# Patient Record
Sex: MALE | Race: OTHER
[De-identification: names, ages, dates, MRNs, and addresses within clinical notes are randomized per-mention and may not be internally consistent; named-entity substitution may affect disease eponyms.]

---

## 2018-01-25 ENCOUNTER — HOSPITAL ENCOUNTER (EMERGENCY)
Dept: HOSPITAL 15 - ER | Age: 33
Discharge: HOME | End: 2018-01-25
Payer: COMMERCIAL

## 2018-01-25 VITALS — DIASTOLIC BLOOD PRESSURE: 107 MMHG | SYSTOLIC BLOOD PRESSURE: 152 MMHG

## 2018-01-25 VITALS — HEIGHT: 72 IN | BODY MASS INDEX: 31.83 KG/M2 | WEIGHT: 235 LBS

## 2018-01-25 DIAGNOSIS — F17.210: ICD-10-CM

## 2018-01-25 DIAGNOSIS — I10: Primary | ICD-10-CM

## 2018-01-25 DIAGNOSIS — I48.91: ICD-10-CM

## 2020-03-02 ENCOUNTER — HOSPITAL ENCOUNTER (EMERGENCY)
Dept: HOSPITAL 15 - ER | Age: 35
Discharge: HOME | End: 2020-03-02
Payer: SELF-PAY

## 2020-03-02 VITALS — SYSTOLIC BLOOD PRESSURE: 161 MMHG | DIASTOLIC BLOOD PRESSURE: 93 MMHG

## 2020-03-02 VITALS — HEIGHT: 73 IN | BODY MASS INDEX: 30.48 KG/M2 | WEIGHT: 230 LBS

## 2020-03-02 DIAGNOSIS — J06.9: Primary | ICD-10-CM

## 2020-03-02 DIAGNOSIS — F17.210: ICD-10-CM

## 2020-03-02 DIAGNOSIS — F12.10: ICD-10-CM

## 2020-03-02 PROCEDURE — 71046 X-RAY EXAM CHEST 2 VIEWS: CPT

## 2020-07-24 ENCOUNTER — HOSPITAL ENCOUNTER (INPATIENT)
Dept: HOSPITAL 15 - ER | Age: 35
LOS: 4 days | Discharge: HOME | DRG: 720 | End: 2020-07-28
Attending: INTERNAL MEDICINE | Admitting: INTERNAL MEDICINE
Payer: COMMERCIAL

## 2020-07-24 VITALS — SYSTOLIC BLOOD PRESSURE: 128 MMHG | DIASTOLIC BLOOD PRESSURE: 69 MMHG

## 2020-07-24 VITALS — WEIGHT: 239.64 LBS | BODY MASS INDEX: 31.76 KG/M2 | HEIGHT: 73 IN

## 2020-07-24 DIAGNOSIS — I12.9: ICD-10-CM

## 2020-07-24 DIAGNOSIS — J18.9: ICD-10-CM

## 2020-07-24 DIAGNOSIS — D61.818: ICD-10-CM

## 2020-07-24 DIAGNOSIS — I48.91: ICD-10-CM

## 2020-07-24 DIAGNOSIS — K70.31: ICD-10-CM

## 2020-07-24 DIAGNOSIS — F17.210: ICD-10-CM

## 2020-07-24 DIAGNOSIS — N17.0: ICD-10-CM

## 2020-07-24 DIAGNOSIS — E87.1: ICD-10-CM

## 2020-07-24 DIAGNOSIS — E44.0: ICD-10-CM

## 2020-07-24 DIAGNOSIS — F10.10: ICD-10-CM

## 2020-07-24 DIAGNOSIS — D68.9: ICD-10-CM

## 2020-07-24 DIAGNOSIS — E87.70: ICD-10-CM
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
## 2020-07-24 DIAGNOSIS — E87.6: ICD-10-CM

## 2020-07-24 DIAGNOSIS — A41.9: Primary | ICD-10-CM

## 2020-07-24 DIAGNOSIS — D63.8: ICD-10-CM

## 2020-07-24 DIAGNOSIS — N18.9: ICD-10-CM

## 2020-07-24 DIAGNOSIS — K72.90: ICD-10-CM

## 2020-07-24 LAB
ALBUMIN SERPL-MCNC: 1.6 G/DL (ref 3.4–5)
ALP SERPL-CCNC: 220 U/L (ref 45–117)
ALT SERPL-CCNC: 41 U/L (ref 16–61)
ANION GAP SERPL CALCULATED.3IONS-SCNC: 11 MMOL/L (ref 5–15)
APTT PPP: 47.4 SEC (ref 23.64–32.05)
BILIRUB SERPL-MCNC: 24.6 MG/DL (ref 0.2–1)
BUN SERPL-MCNC: 12 MG/DL (ref 7–18)
BUN/CREAT SERPL: 10.7
CALCIUM SERPL-MCNC: 7.1 MG/DL (ref 8.5–10.1)
CHLORIDE SERPL-SCNC: 84 MMOL/L (ref 98–107)
CO2 SERPL-SCNC: 26 MMOL/L (ref 21–32)
GLUCOSE SERPL-MCNC: 114 MG/DL (ref 74–106)
HCT VFR BLD AUTO: 33.6 % (ref 41–53)
HGB BLD-MCNC: 11.8 G/DL (ref 13.5–17.5)
INR PPP: 1.6 (ref 0.9–1.15)
LACTATE PLASV-SCNC: 3.1 MMOL/L (ref 0.4–2)
MCH RBC QN AUTO: 35.1 PG (ref 28–32)
MCV RBC AUTO: 100.1 FL (ref 80–100)
NRBC BLD QL AUTO: 0 %
POTASSIUM SERPL-SCNC: 2.9 MMOL/L (ref 3.5–5.1)
PROT SERPL-MCNC: 6.7 G/DL (ref 6.4–8.2)
SODIUM SERPL-SCNC: 121 MMOL/L (ref 136–145)

## 2020-07-24 PROCEDURE — 81001 URINALYSIS AUTO W/SCOPE: CPT

## 2020-07-24 PROCEDURE — 96367 TX/PROPH/DG ADDL SEQ IV INF: CPT

## 2020-07-24 PROCEDURE — 80048 BASIC METABOLIC PNL TOTAL CA: CPT

## 2020-07-24 PROCEDURE — 82140 ASSAY OF AMMONIA: CPT

## 2020-07-24 PROCEDURE — 85025 COMPLETE CBC W/AUTO DIFF WBC: CPT

## 2020-07-24 PROCEDURE — 71250 CT THORAX DX C-: CPT

## 2020-07-24 PROCEDURE — 76705 ECHO EXAM OF ABDOMEN: CPT

## 2020-07-24 PROCEDURE — 74176 CT ABD & PELVIS W/O CONTRAST: CPT

## 2020-07-24 PROCEDURE — 80053 COMPREHEN METABOLIC PANEL: CPT

## 2020-07-24 PROCEDURE — 83605 ASSAY OF LACTIC ACID: CPT

## 2020-07-24 PROCEDURE — 96365 THER/PROPH/DIAG IV INF INIT: CPT

## 2020-07-24 PROCEDURE — 87040 BLOOD CULTURE FOR BACTERIA: CPT

## 2020-07-24 PROCEDURE — 80074 ACUTE HEPATITIS PANEL: CPT

## 2020-07-24 PROCEDURE — 85610 PROTHROMBIN TIME: CPT

## 2020-07-24 PROCEDURE — 85730 THROMBOPLASTIN TIME PARTIAL: CPT

## 2020-07-24 PROCEDURE — 71046 X-RAY EXAM CHEST 2 VIEWS: CPT

## 2020-07-24 PROCEDURE — 36415 COLL VENOUS BLD VENIPUNCTURE: CPT

## 2020-07-24 RX ADMIN — POTASSIUM CHLORIDE SCH MLS/HR: 200 INJECTION, SOLUTION INTRAVENOUS at 12:42

## 2020-07-24 RX ADMIN — POTASSIUM CHLORIDE SCH MLS/HR: 200 INJECTION, SOLUTION INTRAVENOUS at 15:00

## 2020-07-24 RX ADMIN — SPIRONOLACTONE SCH MG: 25 TABLET, FILM COATED ORAL at 19:37

## 2020-07-24 NOTE — NUR
Opening Shift Note

Assumed care of patient, awake and alert.  No S/S of distress/SOB or pain. Instructed on POC 
and to call for assist PRN, will continue to monitor for changes Q1hr and PRN. Bed is locked 
and in lowest position WITH rails up X2. Pt oriented to use of call light for assistance and 
Call light within reach.

## 2020-07-24 NOTE — NUR
Closing Shift Note

Report given to NITHIN Bowen. 

Patient currently sitting up in bed, no signs of distress at this time. Respirations even 
and unlabored.

## 2020-07-24 NOTE — NUR
Patient Arrived

Patient arrived to unit, no signs of distress at this time, respirations even and unlabored. 
Safety precautions in place. Call light within reach, patient encouraged to call PRN. Will 
continue to monitor.

## 2020-07-25 VITALS — SYSTOLIC BLOOD PRESSURE: 137 MMHG | DIASTOLIC BLOOD PRESSURE: 67 MMHG

## 2020-07-25 VITALS — DIASTOLIC BLOOD PRESSURE: 75 MMHG | SYSTOLIC BLOOD PRESSURE: 134 MMHG

## 2020-07-25 VITALS — DIASTOLIC BLOOD PRESSURE: 74 MMHG | SYSTOLIC BLOOD PRESSURE: 135 MMHG

## 2020-07-25 VITALS — DIASTOLIC BLOOD PRESSURE: 67 MMHG | SYSTOLIC BLOOD PRESSURE: 129 MMHG

## 2020-07-25 VITALS — DIASTOLIC BLOOD PRESSURE: 75 MMHG | SYSTOLIC BLOOD PRESSURE: 127 MMHG

## 2020-07-25 LAB
ALBUMIN SERPL-MCNC: 1.3 G/DL (ref 3.4–5)
ALP SERPL-CCNC: 187 U/L (ref 45–117)
ALT SERPL-CCNC: 37 U/L (ref 16–61)
ANION GAP SERPL CALCULATED.3IONS-SCNC: 10 MMOL/L (ref 5–15)
BILIRUB SERPL-MCNC: 21.9 MG/DL (ref 0.2–1)
BUN SERPL-MCNC: 12 MG/DL (ref 7–18)
BUN/CREAT SERPL: 9.6
CALCIUM SERPL-MCNC: 6.9 MG/DL (ref 8.5–10.1)
CHLORIDE SERPL-SCNC: 90 MMOL/L (ref 98–107)
CO2 SERPL-SCNC: 25 MMOL/L (ref 21–32)
GLUCOSE SERPL-MCNC: 85 MG/DL (ref 74–106)
HCT VFR BLD AUTO: 30 % (ref 41–53)
HGB BLD-MCNC: 10.5 G/DL (ref 13.5–17.5)
INR PPP: 1.89 (ref 0.9–1.15)
MCH RBC QN AUTO: 35.6 PG (ref 28–32)
MCV RBC AUTO: 101.4 FL (ref 80–100)
NRBC BLD QL AUTO: 0 %
POTASSIUM SERPL-SCNC: 3 MMOL/L (ref 3.5–5.1)
PROT SERPL-MCNC: 5.6 G/DL (ref 6.4–8.2)
SODIUM SERPL-SCNC: 125 MMOL/L (ref 136–145)

## 2020-07-25 RX ADMIN — SPIRONOLACTONE SCH MG: 25 TABLET, FILM COATED ORAL at 17:06

## 2020-07-25 RX ADMIN — CEFTRIAXONE SODIUM SCH MLS/HR: 1 INJECTION, POWDER, FOR SOLUTION INTRAMUSCULAR; INTRAVENOUS at 09:13

## 2020-07-25 RX ADMIN — PANTOPRAZOLE SODIUM SCH MG: 40 INJECTION, POWDER, FOR SOLUTION INTRAVENOUS at 09:13

## 2020-07-25 RX ADMIN — FUROSEMIDE SCH MG: 40 TABLET ORAL at 09:14

## 2020-07-25 RX ADMIN — BACLOFEN PRN MG: 10 TABLET ORAL at 23:01

## 2020-07-25 RX ADMIN — BACLOFEN PRN MG: 10 TABLET ORAL at 15:11

## 2020-07-25 RX ADMIN — SPIRONOLACTONE SCH MG: 25 TABLET, FILM COATED ORAL at 05:16

## 2020-07-26 VITALS — DIASTOLIC BLOOD PRESSURE: 66 MMHG | SYSTOLIC BLOOD PRESSURE: 121 MMHG

## 2020-07-26 VITALS — DIASTOLIC BLOOD PRESSURE: 65 MMHG | SYSTOLIC BLOOD PRESSURE: 123 MMHG

## 2020-07-26 VITALS — DIASTOLIC BLOOD PRESSURE: 67 MMHG | SYSTOLIC BLOOD PRESSURE: 119 MMHG

## 2020-07-26 VITALS — SYSTOLIC BLOOD PRESSURE: 121 MMHG | DIASTOLIC BLOOD PRESSURE: 63 MMHG

## 2020-07-26 VITALS — DIASTOLIC BLOOD PRESSURE: 64 MMHG | SYSTOLIC BLOOD PRESSURE: 125 MMHG

## 2020-07-26 LAB
ALBUMIN SERPL-MCNC: 1.3 G/DL (ref 3.4–5)
ALP SERPL-CCNC: 176 U/L (ref 45–117)
ALT SERPL-CCNC: 34 U/L (ref 16–61)
ANION GAP SERPL CALCULATED.3IONS-SCNC: 8 MMOL/L (ref 5–15)
ANION GAP SERPL CALCULATED.3IONS-SCNC: 9 MMOL/L (ref 5–15)
BILIRUB SERPL-MCNC: 24.3 MG/DL (ref 0.2–1)
BUN SERPL-MCNC: 17 MG/DL (ref 7–18)
BUN SERPL-MCNC: 20 MG/DL (ref 7–18)
BUN/CREAT SERPL: 11.3
BUN/CREAT SERPL: 11.3
CALCIUM SERPL-MCNC: 6.7 MG/DL (ref 8.5–10.1)
CALCIUM SERPL-MCNC: 6.8 MG/DL (ref 8.5–10.1)
CHLORIDE SERPL-SCNC: 90 MMOL/L (ref 98–107)
CHLORIDE SERPL-SCNC: 92 MMOL/L (ref 98–107)
CO2 SERPL-SCNC: 25 MMOL/L (ref 21–32)
CO2 SERPL-SCNC: 27 MMOL/L (ref 21–32)
GLUCOSE SERPL-MCNC: 123 MG/DL (ref 74–106)
GLUCOSE SERPL-MCNC: 94 MG/DL (ref 74–106)
HCT VFR BLD AUTO: 29 % (ref 41–53)
HGB BLD-MCNC: 10.1 G/DL (ref 13.5–17.5)
INR PPP: 1.73 (ref 0.9–1.15)
MCH RBC QN AUTO: 35.8 PG (ref 28–32)
MCV RBC AUTO: 102.2 FL (ref 80–100)
NRBC BLD QL AUTO: 0 %
POTASSIUM SERPL-SCNC: 2.9 MMOL/L (ref 3.5–5.1)
POTASSIUM SERPL-SCNC: 3 MMOL/L (ref 3.5–5.1)
PROT SERPL-MCNC: 5.5 G/DL (ref 6.4–8.2)
SODIUM SERPL-SCNC: 125 MMOL/L (ref 136–145)
SODIUM SERPL-SCNC: 126 MMOL/L (ref 136–145)

## 2020-07-26 RX ADMIN — FUROSEMIDE SCH MG: 40 TABLET ORAL at 09:27

## 2020-07-26 RX ADMIN — SPIRONOLACTONE SCH MG: 25 TABLET, FILM COATED ORAL at 17:35

## 2020-07-26 RX ADMIN — SPIRONOLACTONE SCH MG: 25 TABLET, FILM COATED ORAL at 05:57

## 2020-07-26 RX ADMIN — DEXTROSE SCH MLS/HR: 50 INJECTION, SOLUTION INTRAVENOUS at 14:22

## 2020-07-26 RX ADMIN — PANTOPRAZOLE SODIUM SCH MG: 40 INJECTION, POWDER, FOR SOLUTION INTRAVENOUS at 09:27

## 2020-07-26 RX ADMIN — CEFTRIAXONE SODIUM SCH MLS/HR: 1 INJECTION, POWDER, FOR SOLUTION INTRAMUSCULAR; INTRAVENOUS at 08:18

## 2020-07-27 VITALS — DIASTOLIC BLOOD PRESSURE: 59 MMHG | SYSTOLIC BLOOD PRESSURE: 124 MMHG

## 2020-07-27 VITALS — DIASTOLIC BLOOD PRESSURE: 65 MMHG | SYSTOLIC BLOOD PRESSURE: 117 MMHG

## 2020-07-27 VITALS — SYSTOLIC BLOOD PRESSURE: 131 MMHG | DIASTOLIC BLOOD PRESSURE: 70 MMHG

## 2020-07-27 VITALS — DIASTOLIC BLOOD PRESSURE: 67 MMHG | SYSTOLIC BLOOD PRESSURE: 112 MMHG

## 2020-07-27 VITALS — DIASTOLIC BLOOD PRESSURE: 64 MMHG | SYSTOLIC BLOOD PRESSURE: 127 MMHG

## 2020-07-27 LAB
ALBUMIN SERPL-MCNC: 1.4 G/DL (ref 3.4–5)
ALP SERPL-CCNC: 176 U/L (ref 45–117)
ALT SERPL-CCNC: 34 U/L (ref 16–61)
ANION GAP SERPL CALCULATED.3IONS-SCNC: 8 MMOL/L (ref 5–15)
ANION GAP SERPL CALCULATED.3IONS-SCNC: 9 MMOL/L (ref 5–15)
BILIRUB SERPL-MCNC: 25 MG/DL (ref 0.2–1)
BUN SERPL-MCNC: 23 MG/DL (ref 7–18)
BUN SERPL-MCNC: 25 MG/DL (ref 7–18)
BUN/CREAT SERPL: 12.6
BUN/CREAT SERPL: 12.6
CALCIUM SERPL-MCNC: 6.6 MG/DL (ref 8.5–10.1)
CALCIUM SERPL-MCNC: 6.8 MG/DL (ref 8.5–10.1)
CHLORIDE SERPL-SCNC: 93 MMOL/L (ref 98–107)
CHLORIDE SERPL-SCNC: 94 MMOL/L (ref 98–107)
CO2 SERPL-SCNC: 26 MMOL/L (ref 21–32)
CO2 SERPL-SCNC: 26 MMOL/L (ref 21–32)
GLUCOSE SERPL-MCNC: 95 MG/DL (ref 74–106)
GLUCOSE SERPL-MCNC: 98 MG/DL (ref 74–106)
HCT VFR BLD AUTO: 27.6 % (ref 41–53)
HGB BLD-MCNC: 9.7 G/DL (ref 13.5–17.5)
INR PPP: 1.66 (ref 0.9–1.15)
MCH RBC QN AUTO: 36 PG (ref 28–32)
MCV RBC AUTO: 102.2 FL (ref 80–100)
NRBC BLD QL AUTO: 0 %
POTASSIUM SERPL-SCNC: 2.8 MMOL/L (ref 3.5–5.1)
POTASSIUM SERPL-SCNC: 3.1 MMOL/L (ref 3.5–5.1)
PROT SERPL-MCNC: 5.3 G/DL (ref 6.4–8.2)
SODIUM SERPL-SCNC: 128 MMOL/L (ref 136–145)
SODIUM SERPL-SCNC: 128 MMOL/L (ref 136–145)

## 2020-07-27 RX ADMIN — FUROSEMIDE SCH MG: 40 TABLET ORAL at 10:09

## 2020-07-27 RX ADMIN — PANTOPRAZOLE SODIUM SCH MG: 40 INJECTION, POWDER, FOR SOLUTION INTRAVENOUS at 10:09

## 2020-07-27 RX ADMIN — CEFTRIAXONE SODIUM SCH MLS/HR: 1 INJECTION, POWDER, FOR SOLUTION INTRAMUSCULAR; INTRAVENOUS at 07:59

## 2020-07-27 RX ADMIN — SPIRONOLACTONE SCH MG: 25 TABLET, FILM COATED ORAL at 18:29

## 2020-07-27 RX ADMIN — DEXTROSE SCH MLS/HR: 50 INJECTION, SOLUTION INTRAVENOUS at 13:03

## 2020-07-27 RX ADMIN — SPIRONOLACTONE SCH MG: 25 TABLET, FILM COATED ORAL at 05:59

## 2020-07-27 NOTE — NUR
Nutrition Assessment Notes



please see attached link for complete assessment



Est Energy needs ABW 95 k-2375kcals (23-25 kcal/kgABW), Est Protein needs: 57-76 
gms/day (0.6-0.8 gm/kgABW r/t elev ammonia). Will continue to monitor and reassess prn.


-------------------------------------------------------------------------------

Addendum: 20 at 1433 by Marley Gonzalez RD

-------------------------------------------------------------------------------

Amended: Links added.

## 2020-07-27 NOTE — NUR
POTASSIUM EFFERVESCENT 50MEQ GIVEN TO PATIENT, AND TOLERATED WELL. PATIENT MADE AWARE OF 
FLUID RESTRICTION AND REMAINING FREE WATER INTAKE AMOUNT.

## 2020-07-27 NOTE — NUR
OPENING SHIFT NOTE:

PATIENT AWAKE IN BED TALKING ON CELL PHONE A/OX4. PATIENT RESPIRATIONS EVEN AND UNLABORED. 
PATIENT NOTED TO BE JAUNDICED IN APPEARANCE, ABDOMEN DISTENDED BOWEL SOUNDS ACTIVE IN THE 
BILATERAL LOWER QUADRANTS. UPDATED ON PLAN OF CARE. CALL LIGHT WITHIN REACH, FALL 
PRECAUTIONS IN PLACE, WILL CONTINUE TO MONITOR.

## 2020-07-27 NOTE — NUR
PHARMACY NOTIFIED NEED FOR 1200 BANANA BAG AND NEW ORDER FOR ORAL VITAMIN K. REQUESTING 15 
MIN THEN THEY WILL SEND VIA BULLET.

## 2020-07-28 VITALS — DIASTOLIC BLOOD PRESSURE: 67 MMHG | SYSTOLIC BLOOD PRESSURE: 118 MMHG

## 2020-07-28 VITALS — SYSTOLIC BLOOD PRESSURE: 110 MMHG | DIASTOLIC BLOOD PRESSURE: 63 MMHG

## 2020-07-28 VITALS — DIASTOLIC BLOOD PRESSURE: 58 MMHG | SYSTOLIC BLOOD PRESSURE: 117 MMHG

## 2020-07-28 VITALS — SYSTOLIC BLOOD PRESSURE: 118 MMHG | DIASTOLIC BLOOD PRESSURE: 67 MMHG

## 2020-07-28 LAB
ALBUMIN SERPL-MCNC: 1.4 G/DL (ref 3.4–5)
ALP SERPL-CCNC: 178 U/L (ref 45–117)
ALT SERPL-CCNC: 35 U/L (ref 16–61)
ANION GAP SERPL CALCULATED.3IONS-SCNC: 9 MMOL/L (ref 5–15)
BILIRUB SERPL-MCNC: 24.5 MG/DL (ref 0.2–1)
BUN SERPL-MCNC: 29 MG/DL (ref 7–18)
BUN/CREAT SERPL: 14.5
CALCIUM SERPL-MCNC: 6.7 MG/DL (ref 8.5–10.1)
CHLORIDE SERPL-SCNC: 94 MMOL/L (ref 98–107)
CO2 SERPL-SCNC: 25 MMOL/L (ref 21–32)
GLUCOSE SERPL-MCNC: 93 MG/DL (ref 74–106)
HCT VFR BLD AUTO: 27.4 % (ref 41–53)
HGB BLD-MCNC: 9.7 G/DL (ref 13.5–17.5)
INR PPP: 1.69 (ref 0.9–1.15)
MCH RBC QN AUTO: 36 PG (ref 28–32)
MCV RBC AUTO: 102.1 FL (ref 80–100)
NRBC BLD QL AUTO: 0 %
POTASSIUM SERPL-SCNC: 3 MMOL/L (ref 3.5–5.1)
PROT SERPL-MCNC: 5.3 G/DL (ref 6.4–8.2)
SODIUM SERPL-SCNC: 128 MMOL/L (ref 136–145)

## 2020-07-28 RX ADMIN — PANTOPRAZOLE SODIUM SCH MG: 40 INJECTION, POWDER, FOR SOLUTION INTRAVENOUS at 09:28

## 2020-07-28 RX ADMIN — DEXTROSE SCH MLS/HR: 50 INJECTION, SOLUTION INTRAVENOUS at 11:58

## 2020-07-28 RX ADMIN — SPIRONOLACTONE SCH MG: 25 TABLET, FILM COATED ORAL at 18:18

## 2020-07-28 RX ADMIN — CEFTRIAXONE SODIUM SCH MLS/HR: 1 INJECTION, POWDER, FOR SOLUTION INTRAMUSCULAR; INTRAVENOUS at 08:16

## 2020-07-28 RX ADMIN — SPIRONOLACTONE SCH MG: 25 TABLET, FILM COATED ORAL at 05:53

## 2020-07-28 RX ADMIN — FUROSEMIDE SCH MG: 40 TABLET ORAL at 09:28

## 2020-07-28 NOTE — NUR
OPENING SHIFT NOTE:

PATIENT AWAKE, ALERT AND ORIENTED X4.  DENIES SOB AND PAIN, NO S/S DISTRESS REPORTED. NOTED 
PATIENT TO BE JAUNDICED IN APPEARANCE, ABDOMEN DISTENDED BOWEL SOUNDS ACTIVE IN THE 
BILATERAL LOWER QUADRANTS. UPDATED ON PLAN OF CARE. CALL LIGHT WITHIN REACH, FALL 
PRECAUTIONS IN PLACE, WILL CONTINUE TO MONITOR.

## 2020-07-28 NOTE — NUR
PATIENT DISCHARGED HOME AT THIS TIME PER MD'S ORDER. PATIENT TO  MEDICATION IN 
PHARMACY TOMORROW DUE TO BEST PHARMACY CLOSED BEFORE . IV DISCONTINUED AND TELE 
MONITOR RETURNED TO ICU.

## 2020-07-28 NOTE — NUR
DR AGUSTIN AT BEDSIDE, DISCUSSED DISCHARGE PLAN WITH PATIENT. 

PER MD PATIENT TO LEAVE AFTER RECEIVING 6HRS OF BANANA BAG WHICH WOULD BE AFTER DINNER.

## 2020-08-08 ENCOUNTER — HOSPITAL ENCOUNTER (INPATIENT)
Dept: HOSPITAL 15 - ER | Age: 35
LOS: 14 days | DRG: 720 | End: 2020-08-22
Attending: INTERNAL MEDICINE | Admitting: INTERNAL MEDICINE
Payer: MEDICAID

## 2020-08-08 VITALS — BODY MASS INDEX: 31.99 KG/M2 | WEIGHT: 241.41 LBS | HEIGHT: 73 IN

## 2020-08-08 VITALS — DIASTOLIC BLOOD PRESSURE: 56 MMHG | SYSTOLIC BLOOD PRESSURE: 111 MMHG

## 2020-08-08 DIAGNOSIS — Z66: ICD-10-CM

## 2020-08-08 DIAGNOSIS — R65.21: ICD-10-CM

## 2020-08-08 DIAGNOSIS — Z51.5: ICD-10-CM

## 2020-08-08 DIAGNOSIS — I48.91: ICD-10-CM

## 2020-08-08 DIAGNOSIS — K76.6: ICD-10-CM

## 2020-08-08 DIAGNOSIS — E43: ICD-10-CM

## 2020-08-08 DIAGNOSIS — A41.9: Primary | ICD-10-CM

## 2020-08-08 DIAGNOSIS — E87.1: ICD-10-CM

## 2020-08-08 DIAGNOSIS — N17.0: ICD-10-CM

## 2020-08-08 DIAGNOSIS — K52.9: ICD-10-CM

## 2020-08-08 DIAGNOSIS — K70.31: ICD-10-CM

## 2020-08-08 DIAGNOSIS — D61.818: ICD-10-CM

## 2020-08-08 DIAGNOSIS — Z82.49: ICD-10-CM

## 2020-08-08 DIAGNOSIS — K76.7: ICD-10-CM

## 2020-08-08 DIAGNOSIS — I10: ICD-10-CM

## 2020-08-08 DIAGNOSIS — Z87.891: ICD-10-CM

## 2020-08-08 DIAGNOSIS — D63.8: ICD-10-CM

## 2020-08-08 DIAGNOSIS — E87.6: ICD-10-CM

## 2020-08-08 DIAGNOSIS — Z87.01: ICD-10-CM

## 2020-08-08 DIAGNOSIS — J18.9: ICD-10-CM

## 2020-08-08 DIAGNOSIS — E66.9: ICD-10-CM

## 2020-08-08 DIAGNOSIS — Z20.828: ICD-10-CM

## 2020-08-08 DIAGNOSIS — Z79.899: ICD-10-CM

## 2020-08-08 DIAGNOSIS — I47.1: ICD-10-CM

## 2020-08-08 LAB
ALBUMIN SERPL-MCNC: 1.3 G/DL (ref 3.4–5)
ALP SERPL-CCNC: 282 U/L (ref 45–117)
ALT SERPL-CCNC: 61 U/L (ref 16–61)
ANION GAP SERPL CALCULATED.3IONS-SCNC: 18 MMOL/L (ref 5–15)
APTT PPP: 89.4 SEC (ref 23–31.2)
BILIRUB SERPL-MCNC: 27.6 MG/DL (ref 0.2–1)
BUN SERPL-MCNC: 100 MG/DL (ref 7–18)
BUN/CREAT SERPL: 14.7
CALCIUM SERPL-MCNC: 7.2 MG/DL (ref 8.5–10.1)
CHLORIDE SERPL-SCNC: 88 MMOL/L (ref 98–107)
CO2 SERPL-SCNC: 13 MMOL/L (ref 21–32)
GLUCOSE SERPL-MCNC: 93 MG/DL (ref 74–106)
HCT VFR BLD AUTO: 30.6 % (ref 41–53)
HGB BLD-MCNC: 10.4 G/DL (ref 13.5–17.5)
INR PPP: 2.4 (ref 0.9–1.15)
MAGNESIUM SERPL-MCNC: 2.9 MG/DL (ref 1.6–2.6)
MCH RBC QN AUTO: 35.1 PG (ref 28–32)
MCV RBC AUTO: 103.3 FL (ref 80–100)
POTASSIUM SERPL-SCNC: 3.1 MMOL/L (ref 3.5–5.1)
PROT SERPL-MCNC: 5 G/DL (ref 6.4–8.2)
PROT UR-MCNC: 77.1 MG/DL (ref 0–11.9)
SODIUM SERPL-SCNC: 119 MMOL/L (ref 136–145)

## 2020-08-08 PROCEDURE — 85018 HEMOGLOBIN: CPT

## 2020-08-08 PROCEDURE — 82140 ASSAY OF AMMONIA: CPT

## 2020-08-08 PROCEDURE — 80053 COMPREHEN METABOLIC PANEL: CPT

## 2020-08-08 PROCEDURE — 81206 BCR/ABL1 GENE MAJOR BP: CPT

## 2020-08-08 PROCEDURE — 85027 COMPLETE CBC AUTOMATED: CPT

## 2020-08-08 PROCEDURE — 90935 HEMODIALYSIS ONE EVALUATION: CPT

## 2020-08-08 PROCEDURE — 84156 ASSAY OF PROTEIN URINE: CPT

## 2020-08-08 PROCEDURE — 82962 GLUCOSE BLOOD TEST: CPT

## 2020-08-08 PROCEDURE — 82378 CARCINOEMBRYONIC ANTIGEN: CPT

## 2020-08-08 PROCEDURE — 87086 URINE CULTURE/COLONY COUNT: CPT

## 2020-08-08 PROCEDURE — 71250 CT THORAX DX C-: CPT

## 2020-08-08 PROCEDURE — 82105 ALPHA-FETOPROTEIN SERUM: CPT

## 2020-08-08 PROCEDURE — 86900 BLOOD TYPING SEROLOGIC ABO: CPT

## 2020-08-08 PROCEDURE — 85384 FIBRINOGEN ACTIVITY: CPT

## 2020-08-08 PROCEDURE — 87205 SMEAR GRAM STAIN: CPT

## 2020-08-08 PROCEDURE — 80048 BASIC METABOLIC PNL TOTAL CA: CPT

## 2020-08-08 PROCEDURE — 85014 HEMATOCRIT: CPT

## 2020-08-08 PROCEDURE — 80074 ACUTE HEPATITIS PANEL: CPT

## 2020-08-08 PROCEDURE — 81001 URINALYSIS AUTO W/SCOPE: CPT

## 2020-08-08 PROCEDURE — 84443 ASSAY THYROID STIM HORMONE: CPT

## 2020-08-08 PROCEDURE — 93005 ELECTROCARDIOGRAM TRACING: CPT

## 2020-08-08 PROCEDURE — 83540 ASSAY OF IRON: CPT

## 2020-08-08 PROCEDURE — 83605 ASSAY OF LACTIC ACID: CPT

## 2020-08-08 PROCEDURE — 82150 ASSAY OF AMYLASE: CPT

## 2020-08-08 PROCEDURE — 83735 ASSAY OF MAGNESIUM: CPT

## 2020-08-08 PROCEDURE — 87081 CULTURE SCREEN ONLY: CPT

## 2020-08-08 PROCEDURE — 80307 DRUG TEST PRSMV CHEM ANLYZR: CPT

## 2020-08-08 PROCEDURE — 89051 BODY FLUID CELL COUNT: CPT

## 2020-08-08 PROCEDURE — 76705 ECHO EXAM OF ABDOMEN: CPT

## 2020-08-08 PROCEDURE — 93306 TTE W/DOPPLER COMPLETE: CPT

## 2020-08-08 PROCEDURE — 99291 CRITICAL CARE FIRST HOUR: CPT

## 2020-08-08 PROCEDURE — 74176 CT ABD & PELVIS W/O CONTRAST: CPT

## 2020-08-08 PROCEDURE — 80320 DRUG SCREEN QUANTALCOHOLS: CPT

## 2020-08-08 PROCEDURE — 85610 PROTHROMBIN TIME: CPT

## 2020-08-08 PROCEDURE — 86850 RBC ANTIBODY SCREEN: CPT

## 2020-08-08 PROCEDURE — 83935 ASSAY OF URINE OSMOLALITY: CPT

## 2020-08-08 PROCEDURE — 85652 RBC SED RATE AUTOMATED: CPT

## 2020-08-08 PROCEDURE — 82805 BLOOD GASES W/O2 SATURATION: CPT

## 2020-08-08 PROCEDURE — 82570 ASSAY OF URINE CREATININE: CPT

## 2020-08-08 PROCEDURE — 84300 ASSAY OF URINE SODIUM: CPT

## 2020-08-08 PROCEDURE — 76775 US EXAM ABDO BACK WALL LIM: CPT

## 2020-08-08 PROCEDURE — 81207 BCR/ABL1 GENE MINOR BP: CPT

## 2020-08-08 PROCEDURE — 83550 IRON BINDING TEST: CPT

## 2020-08-08 PROCEDURE — 82550 ASSAY OF CK (CPK): CPT

## 2020-08-08 PROCEDURE — 83615 LACTATE (LD) (LDH) ENZYME: CPT

## 2020-08-08 PROCEDURE — 76942 ECHO GUIDE FOR BIOPSY: CPT

## 2020-08-08 PROCEDURE — 85730 THROMBOPLASTIN TIME PARTIAL: CPT

## 2020-08-08 PROCEDURE — 85007 BL SMEAR W/DIFF WBC COUNT: CPT

## 2020-08-08 PROCEDURE — 83010 ASSAY OF HAPTOGLOBIN QUANT: CPT

## 2020-08-08 PROCEDURE — 83690 ASSAY OF LIPASE: CPT

## 2020-08-08 PROCEDURE — 84484 ASSAY OF TROPONIN QUANT: CPT

## 2020-08-08 PROCEDURE — 82607 VITAMIN B-12: CPT

## 2020-08-08 PROCEDURE — 71046 X-RAY EXAM CHEST 2 VIEWS: CPT

## 2020-08-08 PROCEDURE — 36600 WITHDRAWAL OF ARTERIAL BLOOD: CPT

## 2020-08-08 PROCEDURE — 82010 KETONE BODYS QUAN: CPT

## 2020-08-08 PROCEDURE — 85045 AUTOMATED RETICULOCYTE COUNT: CPT

## 2020-08-08 PROCEDURE — 87040 BLOOD CULTURE FOR BACTERIA: CPT

## 2020-08-08 PROCEDURE — 86901 BLOOD TYPING SEROLOGIC RH(D): CPT

## 2020-08-08 PROCEDURE — 86920 COMPATIBILITY TEST SPIN: CPT

## 2020-08-08 PROCEDURE — 76700 US EXAM ABDOM COMPLETE: CPT

## 2020-08-08 PROCEDURE — 82728 ASSAY OF FERRITIN: CPT

## 2020-08-08 PROCEDURE — 36415 COLL VENOUS BLD VENIPUNCTURE: CPT

## 2020-08-08 PROCEDURE — 83880 ASSAY OF NATRIURETIC PEPTIDE: CPT

## 2020-08-08 PROCEDURE — 10022: CPT

## 2020-08-08 RX ADMIN — SODIUM CHLORIDE SCH MCG: 9 INJECTION, SOLUTION INTRAVENOUS at 22:11

## 2020-08-08 RX ADMIN — LINEZOLID SCH MLS/HR: 600 INJECTION, SOLUTION INTRAVENOUS at 21:41

## 2020-08-08 RX ADMIN — POTASSIUM BICARBONATE SCH MEQ: 978 TABLET, EFFERVESCENT ORAL at 22:10

## 2020-08-09 LAB
ALBUMIN SERPL-MCNC: 1.7 G/DL (ref 3.4–5)
ALCOHOL, URINE: < 3 MG/DL (ref 0–10)
ALP SERPL-CCNC: 250 U/L (ref 45–117)
ALT SERPL-CCNC: 59 U/L (ref 16–61)
AMPHETAMINES UR QL SCN: NEGATIVE
ANION GAP SERPL CALCULATED.3IONS-SCNC: 15 MMOL/L (ref 5–15)
APTT PPP: 79.3 SEC (ref 23–31.2)
BARBITURATES UR QL SCN: NEGATIVE
BENZODIAZ UR QL SCN: NEGATIVE
BILIRUB SERPL-MCNC: 29.2 MG/DL (ref 0.2–1)
BUN SERPL-MCNC: 109 MG/DL (ref 7–18)
BUN/CREAT SERPL: 16.3
BZE UR QL SCN: NEGATIVE
CALCIUM SERPL-MCNC: 7.3 MG/DL (ref 8.5–10.1)
CANNABINOIDS UR QL SCN: NEGATIVE
CHLORIDE SERPL-SCNC: 88 MMOL/L (ref 98–107)
CO2 SERPL-SCNC: 17 MMOL/L (ref 21–32)
GLUCOSE SERPL-MCNC: 97 MG/DL (ref 74–106)
HCT VFR BLD AUTO: 29.7 % (ref 41–53)
HGB BLD-MCNC: 10 G/DL (ref 13.5–17.5)
INR PPP: 1.86 (ref 0.9–1.15)
MCH RBC QN AUTO: 34.8 PG (ref 28–32)
MCV RBC AUTO: 103.4 FL (ref 80–100)
OPIATES UR QL SCN: NEGATIVE
PCP UR QL SCN: NEGATIVE
POTASSIUM SERPL-SCNC: 4.5 MMOL/L (ref 3.5–5.1)
PROT SERPL-MCNC: 4.7 G/DL (ref 6.4–8.2)
SODIUM SERPL-SCNC: 120 MMOL/L (ref 136–145)

## 2020-08-09 RX ADMIN — SODIUM CHLORIDE SCH MCG: 9 INJECTION, SOLUTION INTRAVENOUS at 14:45

## 2020-08-09 RX ADMIN — MIDODRINE HYDROCHLORIDE SCH MG: 10 TABLET ORAL at 05:37

## 2020-08-09 RX ADMIN — PIPERACILLIN SODIUM AND TAZOBACTAM SODIUM SCH MLS/HR: .25; 2 INJECTION, POWDER, LYOPHILIZED, FOR SOLUTION INTRAVENOUS at 14:00

## 2020-08-09 RX ADMIN — MIDODRINE HYDROCHLORIDE SCH MG: 10 TABLET ORAL at 18:12

## 2020-08-09 RX ADMIN — POTASSIUM BICARBONATE SCH MEQ: 978 TABLET, EFFERVESCENT ORAL at 05:35

## 2020-08-09 RX ADMIN — SODIUM CHLORIDE SCH MCG: 9 INJECTION, SOLUTION INTRAVENOUS at 05:38

## 2020-08-09 RX ADMIN — PANTOPRAZOLE SODIUM SCH MG: 40 INJECTION, POWDER, FOR SOLUTION INTRAVENOUS at 13:45

## 2020-08-09 RX ADMIN — POTASSIUM BICARBONATE SCH MEQ: 978 TABLET, EFFERVESCENT ORAL at 03:02

## 2020-08-09 RX ADMIN — PIPERACILLIN SODIUM AND TAZOBACTAM SODIUM SCH MLS/HR: .25; 2 INJECTION, POWDER, LYOPHILIZED, FOR SOLUTION INTRAVENOUS at 22:43

## 2020-08-09 RX ADMIN — ALBUMIN (HUMAN) SCH MLS/HR: 0.25 INJECTION, SOLUTION INTRAVENOUS at 12:07

## 2020-08-09 RX ADMIN — MIDODRINE HYDROCHLORIDE SCH MG: 10 TABLET ORAL at 12:59

## 2020-08-09 RX ADMIN — ALBUMIN (HUMAN) SCH MLS/HR: 0.25 INJECTION, SOLUTION INTRAVENOUS at 19:41

## 2020-08-09 RX ADMIN — SODIUM CHLORIDE SCH MCG: 9 INJECTION, SOLUTION INTRAVENOUS at 21:57

## 2020-08-09 RX ADMIN — FUROSEMIDE SCH MG: 10 INJECTION, SOLUTION INTRAMUSCULAR; INTRAVENOUS at 05:36

## 2020-08-09 RX ADMIN — PIPERACILLIN SODIUM AND TAZOBACTAM SODIUM SCH MLS/HR: .25; 2 INJECTION, POWDER, LYOPHILIZED, FOR SOLUTION INTRAVENOUS at 05:34

## 2020-08-09 RX ADMIN — DEXTROSE SCH MLS/HR: 5 SOLUTION INTRAVENOUS at 11:12

## 2020-08-09 RX ADMIN — LINEZOLID SCH MLS/HR: 600 INJECTION, SOLUTION INTRAVENOUS at 20:32

## 2020-08-09 RX ADMIN — SODIUM CHLORIDE SCH MLS/HR: 9 INJECTION, SOLUTION INTRAVENOUS at 11:10

## 2020-08-09 RX ADMIN — FUROSEMIDE SCH MG: 10 INJECTION, SOLUTION INTRAMUSCULAR; INTRAVENOUS at 18:12

## 2020-08-09 RX ADMIN — LINEZOLID SCH MLS/HR: 600 INJECTION, SOLUTION INTRAVENOUS at 08:19

## 2020-08-09 RX ADMIN — DEXTROSE SCH MLS/HR: 5 SOLUTION INTRAVENOUS at 21:45

## 2020-08-09 RX ADMIN — POTASSIUM BICARBONATE SCH MEQ: 978 TABLET, EFFERVESCENT ORAL at 10:27

## 2020-08-09 NOTE — NUR
ASSESSED PT FOR PRN MED NEB, PT IS SLEEPING AT THIS TIME. NO INDICATION FOR PRN MED NEB. PT 
ON RA WITH SPO2 98%. WILL CONTINUE TO MONITOR PT.

## 2020-08-09 NOTE — NUR
PT ASSESSED FOR PRN MED NEB, PT AWAKE AND ALERT ON RA WITH SPO2 98% SLEEPING WITH NO 
DISTRESS NOTED. NO INDICATION FOR PRN MED NEB AT THIS TIME. WILL CONTINUE TO MONITOR PT. 

-------------------------------------------------------------------------------

Addendum: 08/09/20 at 1456 by MELLY HOPKINS RT RT

-------------------------------------------------------------------------------

DISREGARD THIS NOTE.

## 2020-08-09 NOTE — NUR
Respiratory note: NO PRN TX GIVEN AT THIS TIME, NOT INDICATED, NO SOB NOTED. SPO2 98% ON 
R/A, HR 87, RR 16.

## 2020-08-10 VITALS — DIASTOLIC BLOOD PRESSURE: 53 MMHG | SYSTOLIC BLOOD PRESSURE: 103 MMHG

## 2020-08-10 VITALS — SYSTOLIC BLOOD PRESSURE: 108 MMHG | DIASTOLIC BLOOD PRESSURE: 43 MMHG

## 2020-08-10 VITALS — SYSTOLIC BLOOD PRESSURE: 142 MMHG | DIASTOLIC BLOOD PRESSURE: 67 MMHG

## 2020-08-10 VITALS — DIASTOLIC BLOOD PRESSURE: 57 MMHG | SYSTOLIC BLOOD PRESSURE: 106 MMHG

## 2020-08-10 VITALS — DIASTOLIC BLOOD PRESSURE: 57 MMHG | SYSTOLIC BLOOD PRESSURE: 118 MMHG

## 2020-08-10 LAB
ALBUMIN SERPL-MCNC: 1.8 G/DL (ref 3.4–5)
ALP SERPL-CCNC: 217 U/L (ref 45–117)
ALT SERPL-CCNC: 65 U/L (ref 16–61)
ANION GAP SERPL CALCULATED.3IONS-SCNC: 17 MMOL/L (ref 5–15)
APTT PPP: 75.9 SEC (ref 23–31.2)
BILIRUB SERPL-MCNC: 31.3 MG/DL (ref 0.2–1)
BUN SERPL-MCNC: 115 MG/DL (ref 7–18)
BUN/CREAT SERPL: 17.7
CALCIUM SERPL-MCNC: 7 MG/DL (ref 8.5–10.1)
CHLORIDE SERPL-SCNC: 85 MMOL/L (ref 98–107)
CO2 SERPL-SCNC: 18 MMOL/L (ref 21–32)
GLUCOSE SERPL-MCNC: 129 MG/DL (ref 74–106)
HCT VFR BLD AUTO: 27.8 % (ref 41–53)
HGB BLD-MCNC: 9.4 G/DL (ref 13.5–17.5)
INR PPP: 1.8 (ref 0.9–1.15)
MCH RBC QN AUTO: 34.9 PG (ref 28–32)
MCV RBC AUTO: 103.6 FL (ref 80–100)
POTASSIUM SERPL-SCNC: 3.6 MMOL/L (ref 3.5–5.1)
PROT SERPL-MCNC: 4.5 G/DL (ref 6.4–8.2)
SODIUM SERPL-SCNC: 120 MMOL/L (ref 136–145)

## 2020-08-10 RX ADMIN — PIPERACILLIN SODIUM AND TAZOBACTAM SODIUM SCH MLS/HR: .25; 2 INJECTION, POWDER, LYOPHILIZED, FOR SOLUTION INTRAVENOUS at 21:06

## 2020-08-10 RX ADMIN — DOPAMINE HYDROCHLORIDE IN DEXTROSE SCH MLS/HR: 1.6 INJECTION, SOLUTION INTRAVENOUS at 13:07

## 2020-08-10 RX ADMIN — MIDODRINE HYDROCHLORIDE SCH MG: 10 TABLET ORAL at 11:46

## 2020-08-10 RX ADMIN — MIDODRINE HYDROCHLORIDE SCH MG: 10 TABLET ORAL at 18:38

## 2020-08-10 RX ADMIN — SODIUM CHLORIDE SCH MLS/HR: 9 INJECTION, SOLUTION INTRAVENOUS at 10:19

## 2020-08-10 RX ADMIN — FUROSEMIDE SCH MG: 10 INJECTION, SOLUTION INTRAMUSCULAR; INTRAVENOUS at 05:52

## 2020-08-10 RX ADMIN — DEXTROSE SCH MLS/HR: 5 SOLUTION INTRAVENOUS at 08:40

## 2020-08-10 RX ADMIN — FUROSEMIDE SCH MG: 10 INJECTION, SOLUTION INTRAMUSCULAR; INTRAVENOUS at 18:36

## 2020-08-10 RX ADMIN — MORPHINE SULFATE PRN MG: 2 INJECTION, SOLUTION INTRAMUSCULAR; INTRAVENOUS at 00:40

## 2020-08-10 RX ADMIN — ALBUMIN (HUMAN) SCH MLS/HR: 0.25 INJECTION, SOLUTION INTRAVENOUS at 02:52

## 2020-08-10 RX ADMIN — LINEZOLID SCH MLS/HR: 600 INJECTION, SOLUTION INTRAVENOUS at 19:48

## 2020-08-10 RX ADMIN — DEXTROSE SCH MLS/HR: 5 SOLUTION INTRAVENOUS at 13:10

## 2020-08-10 RX ADMIN — PIPERACILLIN SODIUM AND TAZOBACTAM SODIUM SCH MLS/HR: .25; 2 INJECTION, POWDER, LYOPHILIZED, FOR SOLUTION INTRAVENOUS at 05:51

## 2020-08-10 RX ADMIN — PIPERACILLIN SODIUM AND TAZOBACTAM SODIUM SCH MLS/HR: .25; 2 INJECTION, POWDER, LYOPHILIZED, FOR SOLUTION INTRAVENOUS at 16:28

## 2020-08-10 RX ADMIN — ONDANSETRON HYDROCHLORIDE PRN MG: 2 INJECTION, SOLUTION INTRAMUSCULAR; INTRAVENOUS at 00:40

## 2020-08-10 RX ADMIN — SODIUM CHLORIDE SCH MCG: 9 INJECTION, SOLUTION INTRAVENOUS at 16:30

## 2020-08-10 RX ADMIN — SODIUM CHLORIDE SCH MCG: 9 INJECTION, SOLUTION INTRAVENOUS at 05:51

## 2020-08-10 RX ADMIN — LINEZOLID SCH MLS/HR: 600 INJECTION, SOLUTION INTRAVENOUS at 07:53

## 2020-08-10 RX ADMIN — SODIUM CHLORIDE SCH MCG: 9 INJECTION, SOLUTION INTRAVENOUS at 21:53

## 2020-08-10 RX ADMIN — PANTOPRAZOLE SODIUM SCH MG: 40 INJECTION, POWDER, FOR SOLUTION INTRAVENOUS at 11:46

## 2020-08-10 RX ADMIN — MIDODRINE HYDROCHLORIDE SCH MG: 10 TABLET ORAL at 05:51

## 2020-08-10 NOTE — NUR
RT NOTE: 

NO TX INDICATED AT THIS TIME. NO SIGNS OF RESPIRATORY DISTRESS. LUNG SOUNDS CLEAR/DIMINISHED 
T/O. ON RA SPO2 93 HR 86 RR 20. PT AWARE TO PAGE FOR RESPIRATORY IF NEED FOR TX ARISES. WILL 
CONTINUE TO MONITOR.

## 2020-08-10 NOTE — NUR
Midline Placement:

Patient educated on need for midline placement.  All risks and benefits explained and all 
questions and concerns addresses prior to procedure.  18g/10cm midline inserted via left 
brachial vein using Ultrasound.  Sterile technique utilized.  Blood return obtained from 
lumen and flushed easily with NS using proper technique.  Midline secured with saline lock; 
biodisc and occlusive dressing applied.  Primary RN notified.

Midline lot #[id number]

## 2020-08-10 NOTE — NUR
ROUNDS

PT CONT WITH DOPAMINE DRIP, IV PATENT 18G TO LEFT AC, NO SWELLING, REDNESS OR LEAKING NOTED, 
/62, P 96, O2 96%, NO DISTRESS NOTED, NO C/O CP, SOB OR ANY OTHER DISCOMFORT, CALL 
LIGHT WITHIN REACH, CONT CARE

## 2020-08-10 NOTE — NUR
Telemetry admit from YOSELIN ROBERT admitted to Telemetry unit after SBAR received.  Patient oriented to Dee steve RN, unit, room, bed, and unit policies regarding patient care and visiting 
hours. Patient now on continuous telemetry monitoring, tele box # 79 and telemetry reading 
on arrival to unit is SR 88. Patient placed on bedside oxygen, weighed by bedscale and 
encouraged to call if they need something. All questions and concerns addressed, patient 
verbalized understanding. Bed alarm activated

## 2020-08-10 NOTE — NUR
Respiratory note: ASSESSED PT FOR PRN MED NEB AT THIS TIME, PT DENIES SOB AT THIS TIME, NO 
RESP DISTRESS NOTED, NO TX INDICATED, PULSE OX 94% ON RA, HR 86, RR 20, BILATERAL BS CLEAR.

## 2020-08-11 VITALS — SYSTOLIC BLOOD PRESSURE: 101 MMHG | DIASTOLIC BLOOD PRESSURE: 50 MMHG

## 2020-08-11 VITALS — SYSTOLIC BLOOD PRESSURE: 119 MMHG | DIASTOLIC BLOOD PRESSURE: 53 MMHG

## 2020-08-11 VITALS — SYSTOLIC BLOOD PRESSURE: 103 MMHG | DIASTOLIC BLOOD PRESSURE: 53 MMHG

## 2020-08-11 VITALS — DIASTOLIC BLOOD PRESSURE: 56 MMHG | SYSTOLIC BLOOD PRESSURE: 136 MMHG

## 2020-08-11 VITALS — SYSTOLIC BLOOD PRESSURE: 130 MMHG | DIASTOLIC BLOOD PRESSURE: 69 MMHG

## 2020-08-11 VITALS — DIASTOLIC BLOOD PRESSURE: 53 MMHG | SYSTOLIC BLOOD PRESSURE: 101 MMHG

## 2020-08-11 LAB
ALBUMIN SERPL-MCNC: 1.8 G/DL (ref 3.4–5)
ALP SERPL-CCNC: 244 U/L (ref 45–117)
ALT SERPL-CCNC: 70 U/L (ref 16–61)
ANION GAP SERPL CALCULATED.3IONS-SCNC: 17 MMOL/L (ref 5–15)
BILIRUB SERPL-MCNC: 28.7 MG/DL (ref 0.2–1)
BUN SERPL-MCNC: 113 MG/DL (ref 7–18)
BUN/CREAT SERPL: 18.2
CALCIUM SERPL-MCNC: 6.9 MG/DL (ref 8.5–10.1)
CHLORIDE SERPL-SCNC: 82 MMOL/L (ref 98–107)
CO2 SERPL-SCNC: 20 MMOL/L (ref 21–32)
GLUCOSE SERPL-MCNC: 103 MG/DL (ref 74–106)
HCT VFR BLD AUTO: 27.7 % (ref 41–53)
HGB BLD-MCNC: 9.5 G/DL (ref 13.5–17.5)
MCH RBC QN AUTO: 35.4 PG (ref 28–32)
MCV RBC AUTO: 102.7 FL (ref 80–100)
POTASSIUM SERPL-SCNC: 3.2 MMOL/L (ref 3.5–5.1)
PROT SERPL-MCNC: 4.3 G/DL (ref 6.4–8.2)
SODIUM SERPL-SCNC: 119 MMOL/L (ref 136–145)

## 2020-08-11 RX ADMIN — DOPAMINE HYDROCHLORIDE IN DEXTROSE SCH MLS/HR: 1.6 INJECTION, SOLUTION INTRAVENOUS at 10:12

## 2020-08-11 RX ADMIN — SODIUM CHLORIDE SCH MCG: 9 INJECTION, SOLUTION INTRAVENOUS at 13:56

## 2020-08-11 RX ADMIN — SODIUM CHLORIDE SCH MCG: 9 INJECTION, SOLUTION INTRAVENOUS at 05:43

## 2020-08-11 RX ADMIN — MIDODRINE HYDROCHLORIDE SCH MG: 10 TABLET ORAL at 17:53

## 2020-08-11 RX ADMIN — LINEZOLID SCH MLS/HR: 600 INJECTION, SOLUTION INTRAVENOUS at 08:17

## 2020-08-11 RX ADMIN — MIDODRINE HYDROCHLORIDE SCH MG: 10 TABLET ORAL at 12:05

## 2020-08-11 RX ADMIN — DEXTROSE SCH MLS/HR: 5 SOLUTION INTRAVENOUS at 05:41

## 2020-08-11 RX ADMIN — PIPERACILLIN SODIUM AND TAZOBACTAM SODIUM SCH MLS/HR: .25; 2 INJECTION, POWDER, LYOPHILIZED, FOR SOLUTION INTRAVENOUS at 13:55

## 2020-08-11 RX ADMIN — POTASSIUM CHLORIDE SCH MLS/HR: 200 INJECTION, SOLUTION INTRAVENOUS at 17:53

## 2020-08-11 RX ADMIN — DEXTROSE SCH MLS/HR: 50 INJECTION, SOLUTION INTRAVENOUS at 18:54

## 2020-08-11 RX ADMIN — FUROSEMIDE SCH MG: 10 INJECTION, SOLUTION INTRAMUSCULAR; INTRAVENOUS at 05:58

## 2020-08-11 RX ADMIN — PIPERACILLIN SODIUM AND TAZOBACTAM SODIUM SCH MLS/HR: .25; 2 INJECTION, POWDER, LYOPHILIZED, FOR SOLUTION INTRAVENOUS at 20:00

## 2020-08-11 RX ADMIN — SODIUM CHLORIDE SCH MCG: 9 INJECTION, SOLUTION INTRAVENOUS at 22:00

## 2020-08-11 RX ADMIN — ALBUMIN (HUMAN) SCH MLS/HR: 0.25 INJECTION, SOLUTION INTRAVENOUS at 13:49

## 2020-08-11 RX ADMIN — PIPERACILLIN SODIUM AND TAZOBACTAM SODIUM SCH MLS/HR: .25; 2 INJECTION, POWDER, LYOPHILIZED, FOR SOLUTION INTRAVENOUS at 02:02

## 2020-08-11 RX ADMIN — DEXTROSE SCH MLS/HR: 50 INJECTION, SOLUTION INTRAVENOUS at 20:00

## 2020-08-11 RX ADMIN — ALBUMIN (HUMAN) SCH MLS/HR: 0.25 INJECTION, SOLUTION INTRAVENOUS at 18:00

## 2020-08-11 RX ADMIN — PIPERACILLIN SODIUM AND TAZOBACTAM SODIUM SCH MLS/HR: .25; 2 INJECTION, POWDER, LYOPHILIZED, FOR SOLUTION INTRAVENOUS at 08:17

## 2020-08-11 RX ADMIN — POTASSIUM CHLORIDE SCH MLS/HR: 200 INJECTION, SOLUTION INTRAVENOUS at 11:45

## 2020-08-11 RX ADMIN — DEXTROSE SCH MLS/HR: 50 INJECTION, SOLUTION INTRAVENOUS at 14:00

## 2020-08-11 RX ADMIN — LINEZOLID SCH MLS/HR: 600 INJECTION, SOLUTION INTRAVENOUS at 20:00

## 2020-08-11 RX ADMIN — PANTOPRAZOLE SODIUM SCH MG: 40 INJECTION, POWDER, FOR SOLUTION INTRAVENOUS at 10:09

## 2020-08-11 RX ADMIN — SODIUM CHLORIDE SCH MLS/HR: 9 INJECTION, SOLUTION INTRAVENOUS at 10:09

## 2020-08-11 RX ADMIN — DEXTROSE SCH MLS/HR: 50 INJECTION, SOLUTION INTRAVENOUS at 10:00

## 2020-08-11 RX ADMIN — MIDODRINE HYDROCHLORIDE SCH MG: 10 TABLET ORAL at 05:43

## 2020-08-11 NOTE — NUR
Est energy needs 4969-3267 kcal (14-16 kcal/kg BW 122kg) Est protein needs 73-92g 
(0.6-0.75g/kg BW 122kg r/t to elevated RFT) Will reassess prn. 

-------------------------------------------------------------------------------

Addendum: 08/11/20 at 1453 by ANGEL MCCOY RD

-------------------------------------------------------------------------------

Amended: Links added.

## 2020-08-11 NOTE — NUR
PICC LINE CONSULTATION

Labs reviewed for PICC line consultation. PTT is currently a critical high value, which is a 
contraindication for PICC line placement at this time. Dr HALINA Aleman was called and notified, 
and he states okay to place a midline instead. Ly WELLER called and notified.

## 2020-08-11 NOTE — NUR
CALLED AND SPOKE TO DR KHALIL REGARDING PATIENT AND PATIENT CARE. PER DR KHALIL ITS OK TO 
UPGRADE PATIENT TO [facility] STATUS AS PATIENT NEEDS A LOWER RATIO OF NURSING TO ADDRESS AND CARE 
FOR ALL NEEDS OF THE PATIENT. PATIENT IS A HIGH ACUITY. I WILL IMPLEMENT ORDERS, PATIENT 
WILL BE [facility] STATUS ON MST FLOOR.

## 2020-08-11 NOTE — NUR
Respiratory note:

PT ASSESSED FOR PRN MED NEB TX, NO TX DESIRED NOR INDICATED. HR 88 RR 18 SPO2 94% ON RA 
BREATH SOUNDS ARE CLEAR/DIMINISHED T/O. PT AND RN AWARE TO HAVE RT PAGED IF NEEDED.

## 2020-08-11 NOTE — NUR
Midline Placement:

Patient educated on need for midline placement.  All risks and benefits explained and all 
questions and concerns addresses prior to procedure.  18g/10cm midline inserted via right 
basilic vein using Ultrasound.  Sterile technique utilized.  Blood return obtained from 
lumen and flushed easily with NS using proper technique.  Midline secured with saline lock; 
biodisc and occlusive dressing applied.  Primary RN notified.

Midline lot #[id number]

## 2020-08-11 NOTE — NUR
RECEIVED A CALL FROM DR WILSON. PER DR WILSON SHE WOULD LIKE PATIENT ON FLUID RESTRICTIONS I 
NOTIFIED DR WILSON PATIENT IS CURRENTLY ON <1 L A DAY PER DR KHALIL.

## 2020-08-11 NOTE — NUR
Transfer to [facility] from room 285B. 

Received report via telephone from Rojelio WELLER. Patient transferred in bed into 263. A&Ox4, 
resting comfortably in no apparent distress. Neuro: moves all extremities; currently BR 
related to plantar foot pain/swelling; at baseline is independent and uses no assistive 
devices; no numbness or tingling per subjective findings. Cardio: NSR 80s with a BB; SBPs 
110s; radial pulses are palpable and strong; DP pulses are weak on palpation with +3 pitting 
edema to BLE. Respiratory: anterior/lateral lung sounds diminished with no cough present. No 
SOB noted. GI: bowel sounds are present in all four quadrants with last BM on 8/11/20 upon 
arrival: patient is incontinent at this time of stool/ black and tarry in nature. Abdominal 
distention noted with firmness on palpation. : knight inserted on 8/8/20 for strict I/O; 
1000 ml FR in place per nephrology. Skin: intact but severely jaundiced. IVs: RUE midline 
inserted on 8/11/20; LUE midline inserted on 8/8/20; left AC 18 g inserted on 8/8/20; right 
wrist 22 g inserted on 8/9/20. Current drips are lasix @ 22 ml/hr and dobutamine @ 2.5 
mcg/kg/min both nontiltrating. COVID precautions in place; pending In House COVID results 
sent at 1640 per lab. POC explained and questions answered. Vital signs are stable except 
rectal temperature of 94.8; bare hugger/warming measures initiated.

## 2020-08-12 VITALS — SYSTOLIC BLOOD PRESSURE: 128 MMHG | DIASTOLIC BLOOD PRESSURE: 49 MMHG

## 2020-08-12 VITALS — SYSTOLIC BLOOD PRESSURE: 115 MMHG | DIASTOLIC BLOOD PRESSURE: 45 MMHG

## 2020-08-12 VITALS — DIASTOLIC BLOOD PRESSURE: 50 MMHG | SYSTOLIC BLOOD PRESSURE: 132 MMHG

## 2020-08-12 VITALS — DIASTOLIC BLOOD PRESSURE: 40 MMHG | SYSTOLIC BLOOD PRESSURE: 120 MMHG

## 2020-08-12 VITALS — SYSTOLIC BLOOD PRESSURE: 131 MMHG | DIASTOLIC BLOOD PRESSURE: 45 MMHG

## 2020-08-12 VITALS — DIASTOLIC BLOOD PRESSURE: 46 MMHG | SYSTOLIC BLOOD PRESSURE: 123 MMHG

## 2020-08-12 VITALS — SYSTOLIC BLOOD PRESSURE: 132 MMHG | DIASTOLIC BLOOD PRESSURE: 47 MMHG

## 2020-08-12 VITALS — SYSTOLIC BLOOD PRESSURE: 149 MMHG | DIASTOLIC BLOOD PRESSURE: 63 MMHG

## 2020-08-12 VITALS — SYSTOLIC BLOOD PRESSURE: 121 MMHG | DIASTOLIC BLOOD PRESSURE: 35 MMHG

## 2020-08-12 VITALS — DIASTOLIC BLOOD PRESSURE: 42 MMHG | SYSTOLIC BLOOD PRESSURE: 114 MMHG

## 2020-08-12 VITALS — DIASTOLIC BLOOD PRESSURE: 42 MMHG | SYSTOLIC BLOOD PRESSURE: 123 MMHG

## 2020-08-12 VITALS — DIASTOLIC BLOOD PRESSURE: 44 MMHG | SYSTOLIC BLOOD PRESSURE: 130 MMHG

## 2020-08-12 LAB
ALBUMIN SERPL-MCNC: 2.2 G/DL (ref 3.4–5)
ALBUMIN SERPL-MCNC: 2.7 G/DL (ref 3.4–5)
ALP SERPL-CCNC: 194 U/L (ref 45–117)
ALP SERPL-CCNC: 222 U/L (ref 45–117)
ALT SERPL-CCNC: 62 U/L (ref 16–61)
ALT SERPL-CCNC: 63 U/L (ref 16–61)
ANION GAP SERPL CALCULATED.3IONS-SCNC: 18 MMOL/L (ref 5–15)
ANION GAP SERPL CALCULATED.3IONS-SCNC: 19 MMOL/L (ref 5–15)
APTT PPP: 97 SEC (ref 23–31.2)
BILIRUB SERPL-MCNC: 30.1 MG/DL (ref 0.2–1)
BILIRUB SERPL-MCNC: 36.4 MG/DL (ref 0.2–1)
BUN SERPL-MCNC: 113 MG/DL (ref 7–18)
BUN SERPL-MCNC: 115 MG/DL (ref 7–18)
BUN/CREAT SERPL: 17.9
BUN/CREAT SERPL: 18.5
CALCIUM SERPL-MCNC: 6.8 MG/DL (ref 8.5–10.1)
CALCIUM SERPL-MCNC: 7.1 MG/DL (ref 8.5–10.1)
CHLORIDE SERPL-SCNC: 82 MMOL/L (ref 98–107)
CHLORIDE SERPL-SCNC: 83 MMOL/L (ref 98–107)
CO2 SERPL-SCNC: 18 MMOL/L (ref 21–32)
CO2 SERPL-SCNC: 19 MMOL/L (ref 21–32)
FERRITIN SERPL-MCNC: > 1650 NG/ML (ref 10–322)
FIBRINOGEN PPP-MCNC: 192 MG/DL (ref 177–375)
GLUCOSE SERPL-MCNC: 130 MG/DL (ref 74–106)
GLUCOSE SERPL-MCNC: 97 MG/DL (ref 74–106)
HCT VFR BLD AUTO: 19.1 % (ref 41–53)
HCT VFR BLD AUTO: 19.6 % (ref 41–53)
HCT VFR BLD AUTO: 26.3 % (ref 41–53)
HGB BLD-MCNC: 6.9 G/DL (ref 13.5–17.5)
HGB BLD-MCNC: 6.9 G/DL (ref 13.5–17.5)
HGB BLD-MCNC: 9 G/DL (ref 13.5–17.5)
INR PPP: 1.84 (ref 0.9–1.15)
IRON SERPL-MCNC: 43 UG/DL (ref 65–175)
MCH RBC QN AUTO: 34.7 PG (ref 28–32)
MCH RBC QN AUTO: 37 PG (ref 28–32)
MCV RBC AUTO: 102 FL (ref 80–100)
MCV RBC AUTO: 102.5 FL (ref 80–100)
POTASSIUM SERPL-SCNC: 2.8 MMOL/L (ref 3.5–5.1)
POTASSIUM SERPL-SCNC: 3.1 MMOL/L (ref 3.5–5.1)
PROT SERPL-MCNC: 4 G/DL (ref 6.4–8.2)
PROT SERPL-MCNC: 4.5 G/DL (ref 6.4–8.2)
SODIUM SERPL-SCNC: 119 MMOL/L (ref 136–145)
SODIUM SERPL-SCNC: 120 MMOL/L (ref 136–145)
TIBC SERPL-MCNC: 35 UG/DL (ref 250–450)

## 2020-08-12 PROCEDURE — 30233N1 TRANSFUSION OF NONAUTOLOGOUS RED BLOOD CELLS INTO PERIPHERAL VEIN, PERCUTANEOUS APPROACH: ICD-10-PCS | Performed by: INTERNAL MEDICINE

## 2020-08-12 RX ADMIN — MIDODRINE HYDROCHLORIDE SCH MG: 10 TABLET ORAL at 05:41

## 2020-08-12 RX ADMIN — MIDODRINE HYDROCHLORIDE SCH MG: 10 TABLET ORAL at 12:00

## 2020-08-12 RX ADMIN — ALBUMIN (HUMAN) SCH MLS/HR: 0.25 INJECTION, SOLUTION INTRAVENOUS at 02:12

## 2020-08-12 RX ADMIN — DOPAMINE HYDROCHLORIDE IN DEXTROSE SCH MLS/HR: 1.6 INJECTION, SOLUTION INTRAVENOUS at 10:15

## 2020-08-12 RX ADMIN — DOPAMINE HYDROCHLORIDE IN DEXTROSE SCH MLS/HR: 1.6 INJECTION, SOLUTION INTRAVENOUS at 18:24

## 2020-08-12 RX ADMIN — DEXTROSE SCH MLS/HR: 50 INJECTION, SOLUTION INTRAVENOUS at 16:00

## 2020-08-12 RX ADMIN — PANTOPRAZOLE SODIUM SCH MG: 40 INJECTION, POWDER, FOR SOLUTION INTRAVENOUS at 10:02

## 2020-08-12 RX ADMIN — PIPERACILLIN SODIUM AND TAZOBACTAM SODIUM SCH MLS/HR: .25; 2 INJECTION, POWDER, LYOPHILIZED, FOR SOLUTION INTRAVENOUS at 02:13

## 2020-08-12 RX ADMIN — ALBUMIN (HUMAN) SCH MLS/HR: 0.25 INJECTION, SOLUTION INTRAVENOUS at 10:03

## 2020-08-12 RX ADMIN — Medication SCH MG: at 22:14

## 2020-08-12 RX ADMIN — DEXTROSE SCH MLS/HR: 50 INJECTION, SOLUTION INTRAVENOUS at 00:40

## 2020-08-12 RX ADMIN — LINEZOLID SCH MLS/HR: 600 INJECTION, SOLUTION INTRAVENOUS at 08:00

## 2020-08-12 RX ADMIN — SODIUM CHLORIDE SCH MCG: 9 INJECTION, SOLUTION INTRAVENOUS at 22:16

## 2020-08-12 RX ADMIN — PANTOPRAZOLE SODIUM SCH MG: 40 INJECTION, POWDER, FOR SOLUTION INTRAVENOUS at 22:03

## 2020-08-12 RX ADMIN — METHYLPREDNISOLONE SODIUM SUCCINATE SCH MG: 40 INJECTION, POWDER, FOR SOLUTION INTRAMUSCULAR; INTRAVENOUS at 22:06

## 2020-08-12 RX ADMIN — SODIUM CHLORIDE SCH MLS/HR: 9 INJECTION, SOLUTION INTRAVENOUS at 22:08

## 2020-08-12 RX ADMIN — SODIUM CHLORIDE SCH MCG: 9 INJECTION, SOLUTION INTRAVENOUS at 05:41

## 2020-08-12 RX ADMIN — ALBUMIN (HUMAN) SCH MLS/HR: 0.25 INJECTION, SOLUTION INTRAVENOUS at 18:49

## 2020-08-12 RX ADMIN — MIDODRINE HYDROCHLORIDE SCH MG: 10 TABLET ORAL at 18:49

## 2020-08-12 RX ADMIN — SODIUM CHLORIDE SCH MCG: 9 INJECTION, SOLUTION INTRAVENOUS at 14:00

## 2020-08-12 RX ADMIN — SODIUM CHLORIDE SCH MLS/HR: 9 INJECTION, SOLUTION INTRAVENOUS at 14:00

## 2020-08-12 RX ADMIN — PIPERACILLIN SODIUM AND TAZOBACTAM SODIUM SCH MLS/HR: .25; 2 INJECTION, POWDER, LYOPHILIZED, FOR SOLUTION INTRAVENOUS at 10:04

## 2020-08-12 RX ADMIN — DEXTROSE SCH MLS/HR: 50 INJECTION, SOLUTION INTRAVENOUS at 11:37

## 2020-08-12 RX ADMIN — DEXTROSE SCH MLS/HR: 50 INJECTION, SOLUTION INTRAVENOUS at 22:13

## 2020-08-12 RX ADMIN — DEXTROSE SCH MLS/HR: 50 INJECTION, SOLUTION INTRAVENOUS at 05:41

## 2020-08-12 RX ADMIN — SODIUM CHLORIDE SCH MLS/HR: 9 INJECTION, SOLUTION INTRAVENOUS at 10:04

## 2020-08-12 NOTE — NUR
Patient bathe/linen change

Patient given complete CHG bath. Skin integrity assessed for any changes. Linens and gown 
changed. Patient repositioned for comfort.

## 2020-08-12 NOTE — NUR
PT RESTING IN BED

INFORMED OF PLAN OF CARE. NO QUESTIONS OR CONCERNS AT THE MOMENT. PT IN BED RESTING. CALL 
LIGHT WITHIN REACH.

## 2020-08-12 NOTE — NUR
NEPHRO



TALKED TO DR. KING OVER THE PHONE INFORMED OF CRITICAL LAB RESULTS

POTASSIUM 2.8 MMOL, , 

TELEPHONE ORDER RECEIVED:

1. POTASSIUM 50 MEQ X 1 DOSE

2. NO ORDERS FOR NA REPLACEMENT, REPEAT LABS IN AM

## 2020-08-12 NOTE — NUR
BLOOD TRANSFUSION COMPLETED 

TOTAL FOR DAY 2 UNITS. NO DISTRESS NOTED. SPOKE TO LAB WILL REDRAW AGAIN AFTER 2 HRS OF 
BLOOD INFUSION.

## 2020-08-12 NOTE — NUR
** Hgb 6.9: per on call hospitalist, repeat H & H in 4 hours. Had patient sign blood consent 
form in preparation.

## 2020-08-12 NOTE — NUR
FAMILY CALLED



PATIENT'S MOM CALLED. CORRECT PASSWORD GIVEN. UPDATED OF PATIENT CONDITION AND POC.

ALL QUESTIONS ANSWERED. VERBALIZED UNDERSTANDING

## 2020-08-12 NOTE — NUR
assessment

Patient is a 35 year old male who is alert and oriented. Prior to admission patient lived 
home with family and functioned independently. Patient informed me he is able to care for 
his own ADLs.  Per patient he will return home to his prior living arrangements post 
discharge and family will transport him home. Patient informed me he stopped drinking 4 
weeks ago. Patient informed me came to the ER for increased yellow and swelling. Patient has 
no DME. Patient has no PCP. Patient has no insurance. Patient has been assessed by Loco Mayo of [facility]. Patients medi-venkat is pending. I have provided patient with resources for 
[facility], Dr. Mcgee, and [facility] urgent care for follow up visits. I have provided 
patient with a prescription card from [employer] assistance program. I will continue to 
monitor and follow up as appropriate. I informed patient he has a right to speak to a social 
worker regarding all care. I informed patient he has a right to participate in any and all 
discharge planning.  Patient does not have a POA and advanced directive. I have offered 
patient information on POA and advanced directives. I informed the patient the advantages 
and benefits of having an Advanced Directive. Patient verbalized understanding and agreed to 
discharge plan.

-------------------------------------------------------------------------------

Addendum: 08/13/20 at 1409 by Anna BURRELL

-------------------------------------------------------------------------------

Amended: Links added.

## 2020-08-12 NOTE — NUR
OPEN NOTES



RECEIVED PATIENT AWAKE AND ALERT, WATCHING TV. ON ROOM AIR, NOT IN DISTRESS.

VS STABLE. ON IV DOPAMINE AND IV LASIX DRIP AT FIXED RATE.

LOWER LIMBS SWOLLEN - ELEVATED WITH PILLOWS.

FULL ASSESSMENT DONE -REFER INTERVENTIONS

## 2020-08-13 VITALS — SYSTOLIC BLOOD PRESSURE: 144 MMHG | DIASTOLIC BLOOD PRESSURE: 72 MMHG

## 2020-08-13 VITALS — DIASTOLIC BLOOD PRESSURE: 74 MMHG | SYSTOLIC BLOOD PRESSURE: 138 MMHG

## 2020-08-13 VITALS — SYSTOLIC BLOOD PRESSURE: 137 MMHG | DIASTOLIC BLOOD PRESSURE: 74 MMHG

## 2020-08-13 VITALS — DIASTOLIC BLOOD PRESSURE: 69 MMHG | SYSTOLIC BLOOD PRESSURE: 132 MMHG

## 2020-08-13 VITALS — DIASTOLIC BLOOD PRESSURE: 67 MMHG | SYSTOLIC BLOOD PRESSURE: 142 MMHG

## 2020-08-13 VITALS — DIASTOLIC BLOOD PRESSURE: 65 MMHG | SYSTOLIC BLOOD PRESSURE: 165 MMHG

## 2020-08-13 VITALS — SYSTOLIC BLOOD PRESSURE: 140 MMHG | DIASTOLIC BLOOD PRESSURE: 80 MMHG

## 2020-08-13 LAB
ALBUMIN SERPL-MCNC: 2.6 G/DL (ref 3.4–5)
ALP SERPL-CCNC: 245 U/L (ref 45–117)
ALT SERPL-CCNC: 67 U/L (ref 16–61)
ANION GAP SERPL CALCULATED.3IONS-SCNC: 18 MMOL/L (ref 5–15)
APTT PPP: 79.1 SEC (ref 23–31.2)
BILIRUB SERPL-MCNC: 36.8 MG/DL (ref 0.2–1)
BUN SERPL-MCNC: 116 MG/DL (ref 7–18)
BUN/CREAT SERPL: 18.3
CALCIUM SERPL-MCNC: 7.3 MG/DL (ref 8.5–10.1)
CHLORIDE SERPL-SCNC: 82 MMOL/L (ref 98–107)
CO2 SERPL-SCNC: 18 MMOL/L (ref 21–32)
GLUCOSE SERPL-MCNC: 136 MG/DL (ref 74–106)
HCT VFR BLD AUTO: 29.4 % (ref 41–53)
HGB BLD-MCNC: 10.1 G/DL (ref 13.5–17.5)
INR PPP: 1.85 (ref 0.9–1.15)
MCH RBC QN AUTO: 34.2 PG (ref 28–32)
MCV RBC AUTO: 99.7 FL (ref 80–100)
POTASSIUM SERPL-SCNC: 3.3 MMOL/L (ref 3.5–5.1)
PROT SERPL-MCNC: 4.8 G/DL (ref 6.4–8.2)
SODIUM SERPL-SCNC: 118 MMOL/L (ref 136–145)

## 2020-08-13 PROCEDURE — 0W9G3ZZ DRAINAGE OF PERITONEAL CAVITY, PERCUTANEOUS APPROACH: ICD-10-PCS | Performed by: RADIOLOGY

## 2020-08-13 RX ADMIN — Medication SCH MG: at 21:53

## 2020-08-13 RX ADMIN — DEXTROSE SCH MLS/HR: 50 INJECTION, SOLUTION INTRAVENOUS at 21:53

## 2020-08-13 RX ADMIN — DEXTROSE SCH MLS/HR: 50 INJECTION, SOLUTION INTRAVENOUS at 09:57

## 2020-08-13 RX ADMIN — SODIUM CHLORIDE SCH MCG: 9 INJECTION, SOLUTION INTRAVENOUS at 22:14

## 2020-08-13 RX ADMIN — DEXTROSE SCH MLS/HR: 50 INJECTION, SOLUTION INTRAVENOUS at 02:50

## 2020-08-13 RX ADMIN — Medication SCH MG: at 09:56

## 2020-08-13 RX ADMIN — SODIUM CHLORIDE SCH MLS/HR: 9 INJECTION, SOLUTION INTRAVENOUS at 05:48

## 2020-08-13 RX ADMIN — DEXTROSE SCH MLS/HR: 50 INJECTION, SOLUTION INTRAVENOUS at 06:41

## 2020-08-13 RX ADMIN — DEXTROSE SCH MLS/HR: 50 INJECTION, SOLUTION INTRAVENOUS at 15:04

## 2020-08-13 RX ADMIN — DEXTROSE SCH MLS/HR: 50 INJECTION, SOLUTION INTRAVENOUS at 06:43

## 2020-08-13 RX ADMIN — SODIUM CHLORIDE SCH MCG: 9 INJECTION, SOLUTION INTRAVENOUS at 15:00

## 2020-08-13 RX ADMIN — MIDODRINE HYDROCHLORIDE SCH MG: 10 TABLET ORAL at 18:26

## 2020-08-13 RX ADMIN — METHYLPREDNISOLONE SODIUM SUCCINATE SCH MG: 40 INJECTION, POWDER, FOR SOLUTION INTRAMUSCULAR; INTRAVENOUS at 09:56

## 2020-08-13 RX ADMIN — SODIUM CHLORIDE SCH MCG: 9 INJECTION, SOLUTION INTRAVENOUS at 05:51

## 2020-08-13 RX ADMIN — SODIUM CHLORIDE SCH MLS/HR: 9 INJECTION, SOLUTION INTRAVENOUS at 10:30

## 2020-08-13 RX ADMIN — MIDODRINE HYDROCHLORIDE SCH MG: 10 TABLET ORAL at 05:49

## 2020-08-13 RX ADMIN — PANTOPRAZOLE SODIUM SCH MG: 40 INJECTION, POWDER, FOR SOLUTION INTRAVENOUS at 21:53

## 2020-08-13 RX ADMIN — PANTOPRAZOLE SODIUM SCH MG: 40 INJECTION, POWDER, FOR SOLUTION INTRAVENOUS at 09:57

## 2020-08-13 RX ADMIN — MIDODRINE HYDROCHLORIDE SCH MG: 10 TABLET ORAL at 12:00

## 2020-08-13 RX ADMIN — METHYLPREDNISOLONE SODIUM SUCCINATE SCH MG: 40 INJECTION, POWDER, FOR SOLUTION INTRAMUSCULAR; INTRAVENOUS at 21:53

## 2020-08-13 NOTE — NUR
[facility] pt transferred to floor

YOSELIN KENYON transferred to 281B via rney on cardiac monitor on room air. All patient 
medications and personal belongings transferred with patient to receiving floor. Patient 
care transferred to Nino Falcon RN.

## 2020-08-13 NOTE — NUR
OPEN NOTES



RECEIVED PATIENT AWAKE AND ALERT, WATCHING TV. ON ROOM AIR, NOT IN DISTRESS.

VS STABLE. ON IV DOPAMINE AND IV LASIX DRIP AT FIXED RATE.

LOWER LIMBS SWOLLEN - ELEVATED WITH PILLOWS.

FULL ASSESSMENT DONE -REFER INTERVENTIONS

-------------------------------------------------------------------------------

Addendum: 08/13/20 at 0114 by Lisa Mayo RN

-------------------------------------------------------------------------------

ENTRY TIME WRONG

## 2020-08-13 NOTE — NUR
[facility] pt transferred to floor



YOSELIN KENYON transferred to 281B via bed on telemetry monitor#75. All patient 
medications and personal belongings transferred with patient to receiving floor. Patient 
care transferred to NITHIN Oneill.

## 2020-08-13 NOTE — NUR
Opening Note

Assumed care of patient, awake and alert.  No S/S of distress/SOB or pain.  Instructed on 
POC and to call for assist as needed, will continue to monitor.

## 2020-08-13 NOTE — NUR
Hospitalist Returned call

Hospitalist Wale Daniel returned call, updated on patient's critical lab values. No new 
orders at this time.  Continue care.

## 2020-08-14 VITALS — SYSTOLIC BLOOD PRESSURE: 115 MMHG | DIASTOLIC BLOOD PRESSURE: 69 MMHG

## 2020-08-14 VITALS — SYSTOLIC BLOOD PRESSURE: 129 MMHG | DIASTOLIC BLOOD PRESSURE: 58 MMHG

## 2020-08-14 VITALS — DIASTOLIC BLOOD PRESSURE: 67 MMHG | SYSTOLIC BLOOD PRESSURE: 132 MMHG

## 2020-08-14 VITALS — SYSTOLIC BLOOD PRESSURE: 136 MMHG | DIASTOLIC BLOOD PRESSURE: 71 MMHG

## 2020-08-14 VITALS — SYSTOLIC BLOOD PRESSURE: 130 MMHG | DIASTOLIC BLOOD PRESSURE: 77 MMHG

## 2020-08-14 LAB
ALBUMIN SERPL-MCNC: 2.2 G/DL (ref 3.4–5)
ALBUMIN SERPL-MCNC: 2.4 G/DL (ref 3.4–5)
ALP SERPL-CCNC: 250 U/L (ref 45–117)
ALP SERPL-CCNC: 265 U/L (ref 45–117)
ALT SERPL-CCNC: 58 U/L (ref 16–61)
ALT SERPL-CCNC: 60 U/L (ref 16–61)
ANION GAP SERPL CALCULATED.3IONS-SCNC: 18 MMOL/L (ref 5–15)
ANION GAP SERPL CALCULATED.3IONS-SCNC: 21 MMOL/L (ref 5–15)
APTT PPP: 61.8 SEC (ref 23–31.2)
BILIRUB SERPL-MCNC: 34.9 MG/DL (ref 0.2–1)
BILIRUB SERPL-MCNC: 36.5 MG/DL (ref 0.2–1)
BUN SERPL-MCNC: 128 MG/DL (ref 7–18)
BUN SERPL-MCNC: 129 MG/DL (ref 7–18)
BUN/CREAT SERPL: 20.8
BUN/CREAT SERPL: 21.5
CALCIUM SERPL-MCNC: 7.8 MG/DL (ref 8.5–10.1)
CALCIUM SERPL-MCNC: 7.8 MG/DL (ref 8.5–10.1)
CHLORIDE SERPL-SCNC: 83 MMOL/L (ref 98–107)
CHLORIDE SERPL-SCNC: 85 MMOL/L (ref 98–107)
CO2 SERPL-SCNC: 18 MMOL/L (ref 21–32)
CO2 SERPL-SCNC: 19 MMOL/L (ref 21–32)
GLUCOSE SERPL-MCNC: 127 MG/DL (ref 74–106)
GLUCOSE SERPL-MCNC: 131 MG/DL (ref 74–106)
HCT VFR BLD AUTO: 28.9 % (ref 41–53)
HGB BLD-MCNC: 10.1 G/DL (ref 13.5–17.5)
INR PPP: 1.79 (ref 0.9–1.15)
MCH RBC QN AUTO: 35.1 PG (ref 28–32)
MCV RBC AUTO: 100.3 FL (ref 80–100)
POTASSIUM SERPL-SCNC: 2.7 MMOL/L (ref 3.5–5.1)
POTASSIUM SERPL-SCNC: 2.8 MMOL/L (ref 3.5–5.1)
PROT SERPL-MCNC: 4.7 G/DL (ref 6.4–8.2)
PROT SERPL-MCNC: 4.8 G/DL (ref 6.4–8.2)
SODIUM SERPL-SCNC: 122 MMOL/L (ref 136–145)
SODIUM SERPL-SCNC: 122 MMOL/L (ref 136–145)

## 2020-08-14 RX ADMIN — DEXTROSE SCH MLS/HR: 50 INJECTION, SOLUTION INTRAVENOUS at 03:14

## 2020-08-14 RX ADMIN — MORPHINE SULFATE PRN MG: 2 INJECTION, SOLUTION INTRAMUSCULAR; INTRAVENOUS at 21:06

## 2020-08-14 RX ADMIN — POTASSIUM CHLORIDE SCH MLS/HR: 200 INJECTION, SOLUTION INTRAVENOUS at 21:06

## 2020-08-14 RX ADMIN — LINEZOLID SCH MLS/HR: 600 INJECTION, SOLUTION INTRAVENOUS at 22:27

## 2020-08-14 RX ADMIN — SODIUM CHLORIDE SCH MCG: 9 INJECTION, SOLUTION INTRAVENOUS at 22:33

## 2020-08-14 RX ADMIN — DEXTROSE SCH MLS/HR: 50 INJECTION, SOLUTION INTRAVENOUS at 13:57

## 2020-08-14 RX ADMIN — DEXTROSE SCH MLS/HR: 50 INJECTION, SOLUTION INTRAVENOUS at 08:14

## 2020-08-14 RX ADMIN — DOPAMINE HYDROCHLORIDE IN DEXTROSE SCH MLS/HR: 1.6 INJECTION, SOLUTION INTRAVENOUS at 10:15

## 2020-08-14 RX ADMIN — LINEZOLID SCH MLS/HR: 600 INJECTION, SOLUTION INTRAVENOUS at 11:24

## 2020-08-14 RX ADMIN — PANTOPRAZOLE SODIUM SCH MG: 40 INJECTION, POWDER, FOR SOLUTION INTRAVENOUS at 09:58

## 2020-08-14 RX ADMIN — SODIUM CHLORIDE SCH MLS/HR: 9 INJECTION, SOLUTION INTRAVENOUS at 10:38

## 2020-08-14 RX ADMIN — DEXTROSE SCH MLS/HR: 50 INJECTION, SOLUTION INTRAVENOUS at 17:53

## 2020-08-14 RX ADMIN — PANTOPRAZOLE SODIUM SCH MG: 40 INJECTION, POWDER, FOR SOLUTION INTRAVENOUS at 22:26

## 2020-08-14 RX ADMIN — MIDODRINE HYDROCHLORIDE SCH MG: 10 TABLET ORAL at 11:38

## 2020-08-14 RX ADMIN — MIDODRINE HYDROCHLORIDE SCH MG: 10 TABLET ORAL at 05:21

## 2020-08-14 RX ADMIN — SODIUM CHLORIDE SCH MCG: 9 INJECTION, SOLUTION INTRAVENOUS at 14:01

## 2020-08-14 RX ADMIN — Medication SCH ML: at 22:21

## 2020-08-14 RX ADMIN — Medication SCH MG: at 11:39

## 2020-08-14 RX ADMIN — MIDODRINE HYDROCHLORIDE SCH MG: 10 TABLET ORAL at 17:53

## 2020-08-14 RX ADMIN — SODIUM CHLORIDE SCH MCG: 9 INJECTION, SOLUTION INTRAVENOUS at 05:21

## 2020-08-14 RX ADMIN — METHYLPREDNISOLONE SODIUM SUCCINATE SCH MG: 40 INJECTION, POWDER, FOR SOLUTION INTRAMUSCULAR; INTRAVENOUS at 09:59

## 2020-08-14 RX ADMIN — ONDANSETRON HYDROCHLORIDE PRN MG: 2 INJECTION, SOLUTION INTRAMUSCULAR; INTRAVENOUS at 21:06

## 2020-08-14 RX ADMIN — DEXTROSE SCH MLS/HR: 50 INJECTION, SOLUTION INTRAVENOUS at 22:34

## 2020-08-14 RX ADMIN — POTASSIUM CHLORIDE SCH MLS/HR: 200 INJECTION, SOLUTION INTRAVENOUS at 16:20

## 2020-08-14 RX ADMIN — Medication SCH MG: at 22:27

## 2020-08-14 RX ADMIN — Medication SCH MG: at 10:00

## 2020-08-14 RX ADMIN — POTASSIUM CHLORIDE SCH MLS/HR: 200 INJECTION, SOLUTION INTRAVENOUS at 19:39

## 2020-08-14 NOTE — NUR
**CRITICAL LAB VALUES**

CALL TO DR GOMEZ TO INFORM OF CRITICAL LAB VALUES WHICH INCLUDE POTASSIUM AND BUN. REFER TO 
LAB TAB. ORDERS RECEIVED READ BACK AND VERIFIED, ALSO PER MD GOMEZ, RN TO CALL DR KHALIL 
FOR ADDITIONAL ORDERS.

## 2020-08-14 NOTE — NUR
Assumed care of patient

Received report from Ethan WELLER. At this time patient has no s/s of distress or SOB. Will 
continue to monitor Q1 and PRN. Safety precautions maintained bed is in lowest position and 
locked, bed rails 2x.

## 2020-08-14 NOTE — NUR
DR KHALIL PAGED THROUGH EXCHANGE TO RELAY MOST RECENT CHEMISTRY LAB VALUES. DETAILED 
MESSAGE LEFT WITH  WITH BEST EXT. WILL AWAIT CALL BACK.

## 2020-08-14 NOTE — NUR
Nutrition Followup Notes



Pt wt is 117.9 kg



Pt was sleeping when rounded this morning.   Pt is with a 2g Sodium diet, appetite is poor 
aeb ave 30% PO intake over 2 meals.  Per RN, pt has a poor appetite d/t is not feeling well, 
and he is refusing to eat some of his meals. Will continue to monitor PO status, skin 
status, pertinent labs and weight trends. Will f/u in 3-5 days.



Est energy needs 6820-2983 kcal (14-16 kcal/kg BW 122kg) Est protein needs 73-92g 
(0.6-0.75g/kg BW 122kg r/t to elevated RFT) Will reassess prn.



LABS:   H, Cr 6.16 H, GFR 11 L, Gluc 127 H, Ca 7.8 L, Alb 2.2 L



GI: Pt had 1 BM on 8/13 per RN doc



BS: 18 mod risk. Refer to wound assessment report for full details.



PES:

Altered nutrition related labs r/t current and chronic medical condition aeb pt with 
elevated RFTs, elevated liver enzymes, hypoalb, 



Obesity r/t caloric intake in excess of needs aeb pt with a BMI of 35.5 kg/m2



Comments 

1) Continue to monitor po intake, labs, skin 

2) Refer pt to OPD on DC 

3) Continue current plan of care 



Consider Renal Specific 2g Na, 2gK, lowphos,  80g protein diet

## 2020-08-14 NOTE — NUR
MARJORIE ROMERO AWARE OF THREE CRITICAL LAB VALUES. NEW ORDERS RECEIVED VIA TELEPHONE, READ BACK 
AND VERIFIED. WILL CARRY OUT.

## 2020-08-14 NOTE — NUR
End of Shift Note

Will endorse care to dayshift RN. At this time patient has no s/s of distress or SOB.

## 2020-08-15 VITALS — SYSTOLIC BLOOD PRESSURE: 122 MMHG | DIASTOLIC BLOOD PRESSURE: 66 MMHG

## 2020-08-15 VITALS — SYSTOLIC BLOOD PRESSURE: 131 MMHG | DIASTOLIC BLOOD PRESSURE: 69 MMHG

## 2020-08-15 VITALS — SYSTOLIC BLOOD PRESSURE: 105 MMHG | DIASTOLIC BLOOD PRESSURE: 64 MMHG

## 2020-08-15 VITALS — DIASTOLIC BLOOD PRESSURE: 53 MMHG | SYSTOLIC BLOOD PRESSURE: 110 MMHG

## 2020-08-15 VITALS — DIASTOLIC BLOOD PRESSURE: 74 MMHG | SYSTOLIC BLOOD PRESSURE: 116 MMHG

## 2020-08-15 LAB
ALBUMIN SERPL-MCNC: 2.4 G/DL (ref 3.4–5)
ALP SERPL-CCNC: 254 U/L (ref 45–117)
ALT SERPL-CCNC: 66 U/L (ref 16–61)
ANION GAP SERPL CALCULATED.3IONS-SCNC: 20 MMOL/L (ref 5–15)
ANION GAP SERPL CALCULATED.3IONS-SCNC: 20 MMOL/L (ref 5–15)
APTT PPP: 60.3 SEC (ref 23–31.2)
BILIRUB SERPL-MCNC: 37.6 MG/DL (ref 0.2–1)
BUN SERPL-MCNC: 134 MG/DL (ref 7–18)
BUN SERPL-MCNC: 142 MG/DL (ref 7–18)
BUN/CREAT SERPL: 22.3
BUN/CREAT SERPL: 23.4
CALCIUM SERPL-MCNC: 7.8 MG/DL (ref 8.5–10.1)
CALCIUM SERPL-MCNC: 7.9 MG/DL (ref 8.5–10.1)
CHLORIDE SERPL-SCNC: 85 MMOL/L (ref 98–107)
CHLORIDE SERPL-SCNC: 86 MMOL/L (ref 98–107)
CO2 SERPL-SCNC: 17 MMOL/L (ref 21–32)
CO2 SERPL-SCNC: 18 MMOL/L (ref 21–32)
GLUCOSE SERPL-MCNC: 105 MG/DL (ref 74–106)
GLUCOSE SERPL-MCNC: 117 MG/DL (ref 74–106)
HCT VFR BLD AUTO: 27.4 % (ref 41–53)
HGB BLD-MCNC: 9.3 G/DL (ref 13.5–17.5)
INR PPP: 1.69 (ref 0.9–1.15)
INR PPP: 1.78 (ref 0.9–1.15)
MCH RBC QN AUTO: 34.1 PG (ref 28–32)
MCV RBC AUTO: 100.8 FL (ref 80–100)
POTASSIUM SERPL-SCNC: 2.6 MMOL/L (ref 3.5–5.1)
POTASSIUM SERPL-SCNC: 3.1 MMOL/L (ref 3.5–5.1)
PROT SERPL-MCNC: 4.7 G/DL (ref 6.4–8.2)
SODIUM SERPL-SCNC: 123 MMOL/L (ref 136–145)
SODIUM SERPL-SCNC: 123 MMOL/L (ref 136–145)

## 2020-08-15 RX ADMIN — LINEZOLID SCH MLS/HR: 600 INJECTION, SOLUTION INTRAVENOUS at 10:51

## 2020-08-15 RX ADMIN — Medication SCH ML: at 22:57

## 2020-08-15 RX ADMIN — Medication SCH ML: at 10:52

## 2020-08-15 RX ADMIN — DEXTROSE SCH MLS/HR: 50 INJECTION, SOLUTION INTRAVENOUS at 04:36

## 2020-08-15 RX ADMIN — DEXTROSE SCH MLS/HR: 50 INJECTION, SOLUTION INTRAVENOUS at 10:51

## 2020-08-15 RX ADMIN — SODIUM CHLORIDE SCH MCG: 9 INJECTION, SOLUTION INTRAVENOUS at 05:35

## 2020-08-15 RX ADMIN — PANTOPRAZOLE SODIUM SCH MG: 40 INJECTION, POWDER, FOR SOLUTION INTRAVENOUS at 22:56

## 2020-08-15 RX ADMIN — Medication SCH MG: at 22:00

## 2020-08-15 RX ADMIN — PANTOPRAZOLE SODIUM SCH MG: 40 INJECTION, POWDER, FOR SOLUTION INTRAVENOUS at 10:52

## 2020-08-15 RX ADMIN — SODIUM CHLORIDE SCH MCG: 9 INJECTION, SOLUTION INTRAVENOUS at 14:39

## 2020-08-15 RX ADMIN — DEXTROSE SCH MLS/HR: 50 INJECTION, SOLUTION INTRAVENOUS at 19:02

## 2020-08-15 RX ADMIN — Medication SCH MG: at 10:52

## 2020-08-15 RX ADMIN — DEXTROSE SCH MLS/HR: 50 INJECTION, SOLUTION INTRAVENOUS at 14:38

## 2020-08-15 RX ADMIN — SODIUM CHLORIDE SCH MCG: 9 INJECTION, SOLUTION INTRAVENOUS at 23:18

## 2020-08-15 RX ADMIN — LINEZOLID SCH MLS/HR: 600 INJECTION, SOLUTION INTRAVENOUS at 23:02

## 2020-08-15 RX ADMIN — MIDODRINE HYDROCHLORIDE SCH MG: 10 TABLET ORAL at 18:02

## 2020-08-15 RX ADMIN — MIDODRINE HYDROCHLORIDE SCH MG: 10 TABLET ORAL at 12:10

## 2020-08-15 RX ADMIN — MIDODRINE HYDROCHLORIDE SCH MG: 10 TABLET ORAL at 05:33

## 2020-08-15 RX ADMIN — DOPAMINE HYDROCHLORIDE IN DEXTROSE SCH MLS/HR: 1.6 INJECTION, SOLUTION INTRAVENOUS at 10:15

## 2020-08-15 RX ADMIN — POTASSIUM CHLORIDE SCH MLS/HR: 200 INJECTION, SOLUTION INTRAVENOUS at 23:20

## 2020-08-15 RX ADMIN — SODIUM CHLORIDE SCH MLS/HR: 9 INJECTION, SOLUTION INTRAVENOUS at 10:51

## 2020-08-15 NOTE — NUR
Received call from Krysten Rosado in lab with critical WBC 71.1 up from 60 and critical K of 
2.6 down from 3.1 and third critical called from Noemy Hutchison in lab with critical BUN of 
142 up from 134. Paged Dr. Basurto, hospitalist.

## 2020-08-15 NOTE — NUR
Called /Critical Lab

Awaiting call back, will endorse to daysdmitry RN. Will continue to monitor patient Q1 and 
PRN.

## 2020-08-15 NOTE — NUR
End of Shift Note

Endorsed care to dayshift RN. At this time patient has no s/s of distress or SOB.

## 2020-08-15 NOTE — NUR
Opening Shift Note

Assumed care of patient, awake and alert watching television.  No S/S of distress/SOB or 
pain. Bed low; HOB in Salazar's position. Nursce call light to pt's L side along with 
hospital and personal cell phones.  Insructed on POC and to callfor assist PRN; pt VU and 
agreement with POC. RN will continue to monitor for changes Q1hr and PRN.

## 2020-08-16 VITALS — DIASTOLIC BLOOD PRESSURE: 66 MMHG | SYSTOLIC BLOOD PRESSURE: 114 MMHG

## 2020-08-16 VITALS — SYSTOLIC BLOOD PRESSURE: 134 MMHG | DIASTOLIC BLOOD PRESSURE: 67 MMHG

## 2020-08-16 VITALS — SYSTOLIC BLOOD PRESSURE: 133 MMHG | DIASTOLIC BLOOD PRESSURE: 69 MMHG

## 2020-08-16 VITALS — SYSTOLIC BLOOD PRESSURE: 128 MMHG | DIASTOLIC BLOOD PRESSURE: 65 MMHG

## 2020-08-16 VITALS — SYSTOLIC BLOOD PRESSURE: 114 MMHG | DIASTOLIC BLOOD PRESSURE: 61 MMHG

## 2020-08-16 LAB
ALBUMIN SERPL-MCNC: 2.4 G/DL (ref 3.4–5)
ALP SERPL-CCNC: 258 U/L (ref 45–117)
ALT SERPL-CCNC: 70 U/L (ref 16–61)
ANION GAP SERPL CALCULATED.3IONS-SCNC: 18 MMOL/L (ref 5–15)
BILIRUB SERPL-MCNC: 36.3 MG/DL (ref 0.2–1)
BUN SERPL-MCNC: 142 MG/DL (ref 7–18)
BUN/CREAT SERPL: 24
CALCIUM SERPL-MCNC: 7.7 MG/DL (ref 8.5–10.1)
CHLORIDE SERPL-SCNC: 86 MMOL/L (ref 98–107)
CO2 SERPL-SCNC: 19 MMOL/L (ref 21–32)
GLUCOSE SERPL-MCNC: 102 MG/DL (ref 74–106)
HCT VFR BLD AUTO: 27.1 % (ref 41–53)
HGB BLD-MCNC: 9.2 G/DL (ref 13.5–17.5)
INR PPP: 1.93 (ref 0.9–1.15)
MCH RBC QN AUTO: 34.2 PG (ref 28–32)
MCV RBC AUTO: 100.7 FL (ref 80–100)
POTASSIUM SERPL-SCNC: 3.3 MMOL/L (ref 3.5–5.1)
PROT SERPL-MCNC: 4.8 G/DL (ref 6.4–8.2)
SODIUM SERPL-SCNC: 123 MMOL/L (ref 136–145)

## 2020-08-16 RX ADMIN — MIDODRINE HYDROCHLORIDE SCH MG: 10 TABLET ORAL at 12:00

## 2020-08-16 RX ADMIN — SODIUM CHLORIDE SCH MLS/HR: 9 INJECTION, SOLUTION INTRAVENOUS at 15:00

## 2020-08-16 RX ADMIN — LINEZOLID SCH MLS/HR: 600 INJECTION, SOLUTION INTRAVENOUS at 21:51

## 2020-08-16 RX ADMIN — DEXTROSE SCH MLS/HR: 50 INJECTION, SOLUTION INTRAVENOUS at 14:30

## 2020-08-16 RX ADMIN — DEXTROSE SCH MLS/HR: 50 INJECTION, SOLUTION INTRAVENOUS at 05:00

## 2020-08-16 RX ADMIN — POTASSIUM CHLORIDE SCH MLS/HR: 200 INJECTION, SOLUTION INTRAVENOUS at 03:18

## 2020-08-16 RX ADMIN — DEXTROSE SCH MLS/HR: 50 INJECTION, SOLUTION INTRAVENOUS at 00:00

## 2020-08-16 RX ADMIN — DEXTROSE SCH MLS/HR: 50 INJECTION, SOLUTION INTRAVENOUS at 15:00

## 2020-08-16 RX ADMIN — DOPAMINE HYDROCHLORIDE IN DEXTROSE SCH MLS/HR: 1.6 INJECTION, SOLUTION INTRAVENOUS at 10:15

## 2020-08-16 RX ADMIN — SODIUM CHLORIDE SCH MCG: 9 INJECTION, SOLUTION INTRAVENOUS at 14:30

## 2020-08-16 RX ADMIN — Medication SCH MG: at 21:51

## 2020-08-16 RX ADMIN — SODIUM CHLORIDE SCH MCG: 9 INJECTION, SOLUTION INTRAVENOUS at 21:52

## 2020-08-16 RX ADMIN — Medication SCH MG: at 10:37

## 2020-08-16 RX ADMIN — DEXTROSE SCH MLS/HR: 50 INJECTION, SOLUTION INTRAVENOUS at 10:37

## 2020-08-16 RX ADMIN — PANTOPRAZOLE SODIUM SCH MG: 40 INJECTION, POWDER, FOR SOLUTION INTRAVENOUS at 21:51

## 2020-08-16 RX ADMIN — Medication SCH ML: at 10:36

## 2020-08-16 RX ADMIN — MIDODRINE HYDROCHLORIDE SCH MG: 10 TABLET ORAL at 06:00

## 2020-08-16 RX ADMIN — Medication SCH ML: at 21:51

## 2020-08-16 RX ADMIN — SODIUM CHLORIDE SCH MCG: 9 INJECTION, SOLUTION INTRAVENOUS at 06:00

## 2020-08-16 RX ADMIN — PANTOPRAZOLE SODIUM SCH MG: 40 INJECTION, POWDER, FOR SOLUTION INTRAVENOUS at 10:36

## 2020-08-16 RX ADMIN — MIDODRINE HYDROCHLORIDE SCH MG: 10 TABLET ORAL at 18:16

## 2020-08-16 RX ADMIN — DEXTROSE SCH MLS/HR: 50 INJECTION, SOLUTION INTRAVENOUS at 20:36

## 2020-08-16 RX ADMIN — LINEZOLID SCH MLS/HR: 600 INJECTION, SOLUTION INTRAVENOUS at 10:37

## 2020-08-16 NOTE — NUR
IV insertion

IV access obtained, via clean sterile technique by inserting 22 gauge catheter at right hand 
after 1 attempt. IV secured properly. No trauma to site. Patient tolerated well.

## 2020-08-16 NOTE — NUR
Critical lab values received from NITHIN Sosa, who took message from lab personnel (unnamed by 
Sheila). WBC now 77.7 and BUN still 142. RNs awaiting call back from hospitalist, already 
paged. Pt not registering temp orally. This RN able to get axillary T, but only 93.0 via R 
arm and 93.2 via L axilla. Pt alert w/a; drops off to sleep easily and quickly. Snoring. 
Will endorse to Day RNMy.

## 2020-08-16 NOTE — NUR
MD at bedside



Dr. Aleman at bedside. Patient updated on POC. Plan to place an IJ dialysis catheter on 
08/17/20 and precede with Dialysis after. MD also spoke with patient's mom Manasa over 
phone, all questions and concerns answered. This RN present during call.

## 2020-08-16 NOTE — NUR
IV removal

Right wrist IV DC'd with clean sterile technique after occlusion noted, catheter fully 
intact. Pressure dressing applied to site. Patient tolerated well.

## 2020-08-16 NOTE — NUR
Opening Note

Assumed care of patient sleeping.  No S/S of distress/SOB or pain. Patient on room air, 
respirations even and unlabored. Bed in low position, locked, call light within reach. Will 
continue to monitor.

## 2020-08-17 VITALS — DIASTOLIC BLOOD PRESSURE: 82 MMHG | SYSTOLIC BLOOD PRESSURE: 112 MMHG

## 2020-08-17 VITALS — SYSTOLIC BLOOD PRESSURE: 95 MMHG | DIASTOLIC BLOOD PRESSURE: 54 MMHG

## 2020-08-17 VITALS — DIASTOLIC BLOOD PRESSURE: 86 MMHG | SYSTOLIC BLOOD PRESSURE: 121 MMHG

## 2020-08-17 VITALS — DIASTOLIC BLOOD PRESSURE: 60 MMHG | SYSTOLIC BLOOD PRESSURE: 124 MMHG

## 2020-08-17 VITALS — DIASTOLIC BLOOD PRESSURE: 63 MMHG | SYSTOLIC BLOOD PRESSURE: 117 MMHG

## 2020-08-17 VITALS — SYSTOLIC BLOOD PRESSURE: 155 MMHG | DIASTOLIC BLOOD PRESSURE: 66 MMHG

## 2020-08-17 VITALS — SYSTOLIC BLOOD PRESSURE: 108 MMHG | DIASTOLIC BLOOD PRESSURE: 56 MMHG

## 2020-08-17 VITALS — DIASTOLIC BLOOD PRESSURE: 53 MMHG | SYSTOLIC BLOOD PRESSURE: 104 MMHG

## 2020-08-17 LAB
ALBUMIN SERPL-MCNC: 2.3 G/DL (ref 3.4–5)
ALP SERPL-CCNC: 247 U/L (ref 45–117)
ALT SERPL-CCNC: 81 U/L (ref 16–61)
ANION GAP SERPL CALCULATED.3IONS-SCNC: 19 MMOL/L (ref 5–15)
BILIRUB SERPL-MCNC: 35.8 MG/DL (ref 0.2–1)
BUN SERPL-MCNC: 160 MG/DL (ref 7–18)
BUN/CREAT SERPL: 26.8
CALCIUM SERPL-MCNC: 7.6 MG/DL (ref 8.5–10.1)
CHLORIDE SERPL-SCNC: 87 MMOL/L (ref 98–107)
CO2 SERPL-SCNC: 18 MMOL/L (ref 21–32)
GLUCOSE SERPL-MCNC: 93 MG/DL (ref 74–106)
HCT VFR BLD AUTO: 24.1 % (ref 41–53)
HCT VFR BLD AUTO: 25.7 % (ref 41–53)
HGB BLD-MCNC: 8.1 G/DL (ref 13.5–17.5)
HGB BLD-MCNC: 8.8 G/DL (ref 13.5–17.5)
MCH RBC QN AUTO: 33.9 PG (ref 28–32)
MCH RBC QN AUTO: 34.5 PG (ref 28–32)
MCV RBC AUTO: 100.1 FL (ref 80–100)
MCV RBC AUTO: 101.2 FL (ref 80–100)
POTASSIUM SERPL-SCNC: 2.3 MMOL/L (ref 3.5–5.1)
PROT SERPL-MCNC: 4.6 G/DL (ref 6.4–8.2)
SODIUM SERPL-SCNC: 124 MMOL/L (ref 136–145)

## 2020-08-17 PROCEDURE — 05HY33Z INSERTION OF INFUSION DEVICE INTO UPPER VEIN, PERCUTANEOUS APPROACH: ICD-10-PCS | Performed by: EMERGENCY MEDICINE

## 2020-08-17 PROCEDURE — 5A1D70Z PERFORMANCE OF URINARY FILTRATION, INTERMITTENT, LESS THAN 6 HOURS PER DAY: ICD-10-PCS | Performed by: INTERNAL MEDICINE

## 2020-08-17 PROCEDURE — 30233R1 TRANSFUSION OF NONAUTOLOGOUS PLATELETS INTO PERIPHERAL VEIN, PERCUTANEOUS APPROACH: ICD-10-PCS | Performed by: INTERNAL MEDICINE

## 2020-08-17 RX ADMIN — SODIUM CHLORIDE SCH MCG: 9 INJECTION, SOLUTION INTRAVENOUS at 14:33

## 2020-08-17 RX ADMIN — POTASSIUM CHLORIDE SCH MLS/HR: 200 INJECTION, SOLUTION INTRAVENOUS at 10:51

## 2020-08-17 RX ADMIN — ONDANSETRON HYDROCHLORIDE PRN MG: 2 INJECTION, SOLUTION INTRAMUSCULAR; INTRAVENOUS at 08:27

## 2020-08-17 RX ADMIN — DEXTROSE SCH MLS/HR: 50 INJECTION, SOLUTION INTRAVENOUS at 18:20

## 2020-08-17 RX ADMIN — SODIUM CHLORIDE SCH MCG: 9 INJECTION, SOLUTION INTRAVENOUS at 05:43

## 2020-08-17 RX ADMIN — DOPAMINE HYDROCHLORIDE IN DEXTROSE SCH MLS/HR: 1.6 INJECTION, SOLUTION INTRAVENOUS at 10:31

## 2020-08-17 RX ADMIN — PANTOPRAZOLE SODIUM SCH MG: 40 INJECTION, POWDER, FOR SOLUTION INTRAVENOUS at 22:57

## 2020-08-17 RX ADMIN — DEXTROSE SCH MLS/HR: 50 INJECTION, SOLUTION INTRAVENOUS at 14:22

## 2020-08-17 RX ADMIN — Medication SCH MG: at 10:45

## 2020-08-17 RX ADMIN — SODIUM CHLORIDE SCH MCG: 9 INJECTION, SOLUTION INTRAVENOUS at 23:00

## 2020-08-17 RX ADMIN — POTASSIUM CHLORIDE SCH MLS/HR: 200 INJECTION, SOLUTION INTRAVENOUS at 14:29

## 2020-08-17 RX ADMIN — Medication SCH ML: at 10:00

## 2020-08-17 RX ADMIN — LINEZOLID SCH MLS/HR: 600 INJECTION, SOLUTION INTRAVENOUS at 10:13

## 2020-08-17 RX ADMIN — ONDANSETRON HYDROCHLORIDE PRN MG: 2 INJECTION, SOLUTION INTRAMUSCULAR; INTRAVENOUS at 18:50

## 2020-08-17 RX ADMIN — Medication SCH ML: at 22:57

## 2020-08-17 RX ADMIN — MIDODRINE HYDROCHLORIDE SCH MG: 10 TABLET ORAL at 13:21

## 2020-08-17 RX ADMIN — DEXTROSE SCH MLS/HR: 50 INJECTION, SOLUTION INTRAVENOUS at 05:42

## 2020-08-17 RX ADMIN — DEXTROSE SCH MLS/HR: 50 INJECTION, SOLUTION INTRAVENOUS at 01:17

## 2020-08-17 RX ADMIN — DEXTROSE SCH MLS/HR: 50 INJECTION, SOLUTION INTRAVENOUS at 23:26

## 2020-08-17 RX ADMIN — DEXTROSE SCH MLS/HR: 50 INJECTION, SOLUTION INTRAVENOUS at 10:46

## 2020-08-17 RX ADMIN — MIDODRINE HYDROCHLORIDE SCH MG: 10 TABLET ORAL at 05:42

## 2020-08-17 RX ADMIN — POTASSIUM CHLORIDE SCH MLS/HR: 200 INJECTION, SOLUTION INTRAVENOUS at 13:30

## 2020-08-17 RX ADMIN — LINEZOLID SCH MLS/HR: 600 INJECTION, SOLUTION INTRAVENOUS at 22:56

## 2020-08-17 RX ADMIN — Medication SCH MG: at 22:59

## 2020-08-17 RX ADMIN — MIDODRINE HYDROCHLORIDE SCH MG: 10 TABLET ORAL at 18:17

## 2020-08-17 RX ADMIN — PANTOPRAZOLE SODIUM SCH MG: 40 INJECTION, POWDER, FOR SOLUTION INTRAVENOUS at 10:23

## 2020-08-17 NOTE — NUR
Telephone order received from Dr. Aleman to transfuse patient with 2 units of platelets. 
Order entered in eMAR.

## 2020-08-17 NOTE — NUR
Nephrologist



Received telephone order from Dr. Carrera to discontinue the IV potassium. Potassium will be 
replaced by dialysis RN.

## 2020-08-17 NOTE — NUR
Tunnel Cath 



Patient will not have tunnel catheter placed today due to low platelets. Will have Charli 
catheter placed today so he can start dialysis.

## 2020-08-17 NOTE — NUR
Blood Pressure 



Received call from dialysis RN stating that patient's blood pressure decreased to 88/45 and 
he is complaining of having difficulty breathing. O2 sat on room air was 89, elevated head 
of bed and started patient on 2L O2 by nasal canula. Saturation up to 93-94%

## 2020-08-17 NOTE — NUR
Vomiting 



Received call from aid that patient is bleeding. On assessment, patient vomited large amount 
of blood. Medicated with zofran for nausea and assisted with hygienic needs. B/p 121/54, HR 
89. Will page hospitalist.

## 2020-08-17 NOTE — NUR
Opening Shift Note

Assumed care of patient, awake and alert, oriented x 4, follows direction. On room air with 
even and unlabored respirations, no S/S of distress or SOB. Patient denies pain and nausea. 
Ramires intact and draining to gravity. Bed in lowest locked position with side rails up x 2 
and call light within reach. Instructed on POC and to call for assist PRN, will continue to 
monitor for changes Q1hr and PRN.

## 2020-08-17 NOTE — NUR
Nutrition Followup Notes



Pt wt is 110.9 kg



Pt was awake, confused when rounded this morning.   Pt is with a 2g Sodium diet, appetite is 
improved aeb 75% PO intake per RN doc.  Noted pt is being evaluated for dialysis. Will 
continue to monitor PO status, skin status, pertinent labs and weight trends. Will f/u in 
3-5 days.



Est energy needs 5695-0962 kcal (14-16 kcal/kg BW 122kg) Est protein needs 73-92g 
(0.6-0.75g/kg BW 122kg r/t to elevated RFT) Will reassess prn.



LABS:   H, Cr 5.96 H, GFR 12 L, Ca 7.6 L, Alb 2.3 L, Bili 35.8 H,  H, Alt 81 
H, Alk Phos 247 H



GI: Pt had 1 BM on 8/13 per RN doc



BS: 18 mod risk. Refer to wound assessment report for full details.



PES:

Altered nutrition related labs r/t current and chronic medical condition aeb pt with 
elevated RFTs, elevated liver enzymes, hypoalb, 



Obesity r/t caloric intake in excess of needs aeb pt with a BMI of 35.5 kg/m2



Comments 

1) Continue to monitor po intake, labs, skin 

2) Refer pt to OPD on DC 

3) Continue current plan of care 

     

Consider Renal Specific 2g Na, 2gK, lowphos,  80g protein diet

## 2020-08-17 NOTE — NUR
Opening Shift Note

Assumed care of patient, awake and alert.  No S/S of distress/SOB or pain.  Call light 
placed within reach and patient was instructed on POC and to call for assist PRN, will 
continue to monitor for changes Q1hr and PRN.

## 2020-08-17 NOTE — NUR
Telephone orders 



Received telephone orders from on-call hospitalist to transfuse 1 unit FFP, repeat CBC and 
if hemoglobin less than 7, transfuse with 1 unit PRBC.

## 2020-08-18 VITALS — SYSTOLIC BLOOD PRESSURE: 115 MMHG | DIASTOLIC BLOOD PRESSURE: 59 MMHG

## 2020-08-18 VITALS — SYSTOLIC BLOOD PRESSURE: 114 MMHG | DIASTOLIC BLOOD PRESSURE: 60 MMHG

## 2020-08-18 VITALS — SYSTOLIC BLOOD PRESSURE: 107 MMHG | DIASTOLIC BLOOD PRESSURE: 56 MMHG

## 2020-08-18 VITALS — DIASTOLIC BLOOD PRESSURE: 64 MMHG | SYSTOLIC BLOOD PRESSURE: 123 MMHG

## 2020-08-18 VITALS — DIASTOLIC BLOOD PRESSURE: 69 MMHG | SYSTOLIC BLOOD PRESSURE: 117 MMHG

## 2020-08-18 VITALS — DIASTOLIC BLOOD PRESSURE: 70 MMHG | SYSTOLIC BLOOD PRESSURE: 114 MMHG

## 2020-08-18 VITALS — SYSTOLIC BLOOD PRESSURE: 118 MMHG | DIASTOLIC BLOOD PRESSURE: 86 MMHG

## 2020-08-18 VITALS — SYSTOLIC BLOOD PRESSURE: 119 MMHG | DIASTOLIC BLOOD PRESSURE: 61 MMHG

## 2020-08-18 VITALS — DIASTOLIC BLOOD PRESSURE: 86 MMHG | SYSTOLIC BLOOD PRESSURE: 118 MMHG

## 2020-08-18 LAB
ALBUMIN SERPL-MCNC: 2.3 G/DL (ref 3.4–5)
ALP SERPL-CCNC: 241 U/L (ref 45–117)
ALT SERPL-CCNC: 88 U/L (ref 16–61)
ANION GAP SERPL CALCULATED.3IONS-SCNC: 17 MMOL/L (ref 5–15)
BILIRUB SERPL-MCNC: 32.9 MG/DL (ref 0.2–1)
BUN SERPL-MCNC: 135 MG/DL (ref 7–18)
BUN/CREAT SERPL: 25.6
CALCIUM SERPL-MCNC: 7.6 MG/DL (ref 8.5–10.1)
CHLORIDE SERPL-SCNC: 89 MMOL/L (ref 98–107)
CO2 SERPL-SCNC: 21 MMOL/L (ref 21–32)
GLUCOSE SERPL-MCNC: 110 MG/DL (ref 74–106)
HCT VFR BLD AUTO: 24.8 % (ref 41–53)
HGB BLD-MCNC: 8.5 G/DL (ref 13.5–17.5)
MCH RBC QN AUTO: 34.5 PG (ref 28–32)
MCV RBC AUTO: 100.4 FL (ref 80–100)
POTASSIUM SERPL-SCNC: 2.2 MMOL/L (ref 3.5–5.1)
PROT SERPL-MCNC: 4.7 G/DL (ref 6.4–8.2)
SODIUM SERPL-SCNC: 127 MMOL/L (ref 136–145)

## 2020-08-18 PROCEDURE — 30233K1 TRANSFUSION OF NONAUTOLOGOUS FROZEN PLASMA INTO PERIPHERAL VEIN, PERCUTANEOUS APPROACH: ICD-10-PCS | Performed by: INTERNAL MEDICINE

## 2020-08-18 PROCEDURE — 5A1D70Z PERFORMANCE OF URINARY FILTRATION, INTERMITTENT, LESS THAN 6 HOURS PER DAY: ICD-10-PCS | Performed by: INTERNAL MEDICINE

## 2020-08-18 RX ADMIN — Medication SCH ML: at 21:47

## 2020-08-18 RX ADMIN — DEXTROSE SCH MLS/HR: 50 INJECTION, SOLUTION INTRAVENOUS at 04:54

## 2020-08-18 RX ADMIN — DEXTROSE SCH MLS/HR: 50 INJECTION, SOLUTION INTRAVENOUS at 16:19

## 2020-08-18 RX ADMIN — MIDODRINE HYDROCHLORIDE SCH MG: 10 TABLET ORAL at 17:51

## 2020-08-18 RX ADMIN — LINEZOLID SCH MLS/HR: 600 INJECTION, SOLUTION INTRAVENOUS at 13:00

## 2020-08-18 RX ADMIN — MIDODRINE HYDROCHLORIDE SCH MG: 10 TABLET ORAL at 05:29

## 2020-08-18 RX ADMIN — Medication SCH ML: at 13:00

## 2020-08-18 RX ADMIN — PANTOPRAZOLE SODIUM SCH MG: 40 INJECTION, POWDER, FOR SOLUTION INTRAVENOUS at 13:00

## 2020-08-18 RX ADMIN — DOPAMINE HYDROCHLORIDE IN DEXTROSE SCH MLS/HR: 1.6 INJECTION, SOLUTION INTRAVENOUS at 13:01

## 2020-08-18 RX ADMIN — Medication SCH MG: at 21:46

## 2020-08-18 RX ADMIN — PANTOPRAZOLE SODIUM SCH MG: 40 INJECTION, POWDER, FOR SOLUTION INTRAVENOUS at 21:48

## 2020-08-18 RX ADMIN — LINEZOLID SCH MLS/HR: 600 INJECTION, SOLUTION INTRAVENOUS at 21:50

## 2020-08-18 RX ADMIN — Medication SCH MG: at 13:00

## 2020-08-18 RX ADMIN — SODIUM CHLORIDE SCH MCG: 9 INJECTION, SOLUTION INTRAVENOUS at 21:54

## 2020-08-18 RX ADMIN — SODIUM CHLORIDE SCH MCG: 9 INJECTION, SOLUTION INTRAVENOUS at 05:29

## 2020-08-18 RX ADMIN — SODIUM CHLORIDE SCH MCG: 9 INJECTION, SOLUTION INTRAVENOUS at 16:20

## 2020-08-18 RX ADMIN — DEXTROSE SCH MLS/HR: 50 INJECTION, SOLUTION INTRAVENOUS at 07:00

## 2020-08-18 RX ADMIN — MIDODRINE HYDROCHLORIDE SCH MG: 10 TABLET ORAL at 13:01

## 2020-08-18 NOTE — NUR
Closing note

patient resting in bed with even and unlabored respirations, no s/s of distress. Ramires 
intact and draining to gravity. Dopamine and Lasix drip infusing per orders. Bed in low 
locked position with side rails up x 2 and call light within reach.

## 2020-08-18 NOTE — NUR
AM meds/HD

Patient receiving dialysis this morning. Will give AM medications after dialysis.

Lasix and Dopamine drips running.

## 2020-08-18 NOTE — NUR
Plasma Transfusion Ended

no reaction noted. no s/s of distress. patient tolerated well. will continue care.

## 2020-08-18 NOTE — NUR
RECEIVED PATIENT FROM DAY SHIFT RN. PATIENT RESTING IN BED. ALERT AND ORIENTED X 4. NO S/S 
OF DISTRESS NOTED. BREATHING EVEN AND UNLABORED ON RA. PATIENT DENIED PAIN AND NAUSEA AT 
THIS TIME. PARR CATH IN PLACE INTACT AND DRAINING TO GRAVITY. POC INSTRUCTED AND ENCOURAGED 
PATIENT TO CALL FOR ASSISTANT IF NEEDED. BE DIN LOWEST LOCKED POSITION WITH SIDE RAILS UP X 
2. CALL BELL WITHIN REACH. ALARM ON. CONTINUE TO MONITOR FOR CHANGES Q1H AND PRN.

## 2020-08-19 VITALS — SYSTOLIC BLOOD PRESSURE: 107 MMHG | DIASTOLIC BLOOD PRESSURE: 58 MMHG

## 2020-08-19 VITALS — DIASTOLIC BLOOD PRESSURE: 55 MMHG | SYSTOLIC BLOOD PRESSURE: 119 MMHG

## 2020-08-19 VITALS — DIASTOLIC BLOOD PRESSURE: 54 MMHG | SYSTOLIC BLOOD PRESSURE: 114 MMHG

## 2020-08-19 VITALS — DIASTOLIC BLOOD PRESSURE: 56 MMHG | SYSTOLIC BLOOD PRESSURE: 102 MMHG

## 2020-08-19 VITALS — SYSTOLIC BLOOD PRESSURE: 112 MMHG | DIASTOLIC BLOOD PRESSURE: 58 MMHG

## 2020-08-19 LAB
ALBUMIN SERPL-MCNC: 2.3 G/DL (ref 3.4–5)
ALP SERPL-CCNC: 252 U/L (ref 45–117)
ALT SERPL-CCNC: 93 U/L (ref 16–61)
ANION GAP SERPL CALCULATED.3IONS-SCNC: 17 MMOL/L (ref 5–15)
BILIRUB SERPL-MCNC: 32.3 MG/DL (ref 0.2–1)
BUN SERPL-MCNC: 118 MG/DL (ref 7–18)
BUN/CREAT SERPL: 25
CALCIUM SERPL-MCNC: 7.6 MG/DL (ref 8.5–10.1)
CHLORIDE SERPL-SCNC: 92 MMOL/L (ref 98–107)
CO2 SERPL-SCNC: 21 MMOL/L (ref 21–32)
GLUCOSE SERPL-MCNC: 119 MG/DL (ref 74–106)
HCT VFR BLD AUTO: 22.4 % (ref 41–53)
HGB BLD-MCNC: 7.9 G/DL (ref 13.5–17.5)
MCH RBC QN AUTO: 35.2 PG (ref 28–32)
MCV RBC AUTO: 99.7 FL (ref 80–100)
POTASSIUM SERPL-SCNC: 2.4 MMOL/L (ref 3.5–5.1)
PROT SERPL-MCNC: 4.6 G/DL (ref 6.4–8.2)
SODIUM SERPL-SCNC: 130 MMOL/L (ref 136–145)

## 2020-08-19 PROCEDURE — 5A1D70Z PERFORMANCE OF URINARY FILTRATION, INTERMITTENT, LESS THAN 6 HOURS PER DAY: ICD-10-PCS | Performed by: INTERNAL MEDICINE

## 2020-08-19 RX ADMIN — DOPAMINE HYDROCHLORIDE IN DEXTROSE SCH MLS/HR: 1.6 INJECTION, SOLUTION INTRAVENOUS at 10:44

## 2020-08-19 RX ADMIN — Medication SCH ML: at 23:00

## 2020-08-19 RX ADMIN — PANTOPRAZOLE SODIUM SCH MG: 40 INJECTION, POWDER, FOR SOLUTION INTRAVENOUS at 10:45

## 2020-08-19 RX ADMIN — ONDANSETRON HYDROCHLORIDE PRN MG: 2 INJECTION, SOLUTION INTRAMUSCULAR; INTRAVENOUS at 00:24

## 2020-08-19 RX ADMIN — PANTOPRAZOLE SODIUM SCH MG: 40 INJECTION, POWDER, FOR SOLUTION INTRAVENOUS at 23:00

## 2020-08-19 RX ADMIN — SODIUM CHLORIDE SCH MCG: 9 INJECTION, SOLUTION INTRAVENOUS at 05:37

## 2020-08-19 RX ADMIN — SODIUM CHLORIDE SCH MCG: 9 INJECTION, SOLUTION INTRAVENOUS at 23:01

## 2020-08-19 RX ADMIN — Medication SCH MG: at 23:00

## 2020-08-19 RX ADMIN — MIDODRINE HYDROCHLORIDE SCH MG: 10 TABLET ORAL at 18:20

## 2020-08-19 RX ADMIN — SODIUM CHLORIDE SCH MCG: 9 INJECTION, SOLUTION INTRAVENOUS at 14:12

## 2020-08-19 RX ADMIN — POTASSIUM CHLORIDE SCH MLS/HR: 200 INJECTION, SOLUTION INTRAVENOUS at 16:47

## 2020-08-19 RX ADMIN — POTASSIUM CHLORIDE SCH MLS/HR: 200 INJECTION, SOLUTION INTRAVENOUS at 14:33

## 2020-08-19 RX ADMIN — MIDODRINE HYDROCHLORIDE SCH MG: 10 TABLET ORAL at 05:36

## 2020-08-19 RX ADMIN — Medication SCH MG: at 10:45

## 2020-08-19 RX ADMIN — Medication SCH ML: at 10:45

## 2020-08-19 RX ADMIN — MIDODRINE HYDROCHLORIDE SCH MG: 10 TABLET ORAL at 14:11

## 2020-08-19 NOTE — NUR
PATIENT VOMITED TEA COLOR EMESIS AROUND 400ML. MEDICATED PATIENT FOR NAUSEA/VOMITING AS 
ORDERED. CONTINUE TO MONITOR.

## 2020-08-19 NOTE — NUR
HOSPITALIST returned call

NP ROMERO  returned call, updated on patient status and reason for call, orders received. 
POTASSIUM 60 AVERY PO. WILL CARRY IT OUT. Continue care.

## 2020-08-19 NOTE — NUR
travis from dialysis advised me 1 liter taken out and blood pressure is 123/58, and dressing 
was recently changed.

## 2020-08-19 NOTE — NUR
Opening Shift Note

Assumed care of patient, pt is laying in bed quietly, on room air, with even and unlabored 
respirations. Pt aroused by name but is lethargic. No S/S of distress/SOB or pain at this 
time. Bed is in lowest locked position, wheels are locked, side rails up x2, bed alarm set 
for safety, and call light is within reach. Dopamine infusing at 9.7 ml/hr. Instructed on 
POC and to call for assist PRN, will continue to monitor for changes Q1hr and PRN.

## 2020-08-19 NOTE — NUR
Opening Shift Note

Assumed care of patient, awake and alert x2.   No S/S of distress/SOB or pain.  Instructed 
on POC.  Bed is locked in lowest position with side rails up x2 for safety, call light is 
within reach and encouraged to call for assistance. Will continue to monitor for changes 
Q1hr and PRN.

## 2020-08-19 NOTE — NUR
SMALL RIGHT HEEL PRESSURE AREA NOTED. 

AREA IS ON BOTTOM OF RIGHT HEEL. AREA IS RED AND NON-BLANCHABLE. HEELS OFF LOADED, WILL 
APPLY FOAM POSEY BOOTS. WILL TAKE PICTURE AND PLACE WOUND CONSULT. WILL CONTINUE TO MONITOR 
Q1H AND PRN.

## 2020-08-19 NOTE — NUR
Patient had 1 bout of vomitus.  Patient states no pain and no nausea. Complete bed change at 
this time. Will continue to monitor

## 2020-08-19 NOTE — NUR
DR. GOMEZ AT BEDSIDE

DR. GOMEZ AT BEDSIDE EVALUATING PATIENT. UPDATED MD ON PATIENT CONDITION, INCLUDING PT HAS 
PERIODS OF CONFUSION/FORGETFULNESS AND APPEARS TO BE VERY LETHARGIC. ALSO INFORMED OF 
POTASSIUM LEVELS.  WILL CONTINUE TO MONITOR Q1H AND PRN.

## 2020-08-19 NOTE — NUR
PATIENT RESTING IN BED. NO S/S OF DISTRESS NOTED. DENIED PAIN AND NAUSEA FOR NOW. CONTINUE 
TO MONITOR.

## 2020-08-19 NOTE — NUR
RECEIVED CRITICAL LAB RESULT, , WBC 54.6, POTASSIUM 2.4. NITHIN HOUSTON PAGED HOSPITALIST 
NP ROMERO, WAITING TO TALK TO HIM WHEN HE CALL BACK FOR NITHIN HOUSTON. CONTINUE TO MONITOR.

## 2020-08-20 VITALS — SYSTOLIC BLOOD PRESSURE: 110 MMHG | DIASTOLIC BLOOD PRESSURE: 85 MMHG

## 2020-08-20 VITALS — DIASTOLIC BLOOD PRESSURE: 49 MMHG | SYSTOLIC BLOOD PRESSURE: 104 MMHG

## 2020-08-20 VITALS — SYSTOLIC BLOOD PRESSURE: 101 MMHG | DIASTOLIC BLOOD PRESSURE: 48 MMHG

## 2020-08-20 VITALS — DIASTOLIC BLOOD PRESSURE: 25 MMHG | SYSTOLIC BLOOD PRESSURE: 95 MMHG

## 2020-08-20 VITALS — SYSTOLIC BLOOD PRESSURE: 101 MMHG | DIASTOLIC BLOOD PRESSURE: 54 MMHG

## 2020-08-20 VITALS — SYSTOLIC BLOOD PRESSURE: 109 MMHG | DIASTOLIC BLOOD PRESSURE: 66 MMHG

## 2020-08-20 VITALS — DIASTOLIC BLOOD PRESSURE: 40 MMHG | SYSTOLIC BLOOD PRESSURE: 107 MMHG

## 2020-08-20 VITALS — DIASTOLIC BLOOD PRESSURE: 55 MMHG | SYSTOLIC BLOOD PRESSURE: 110 MMHG

## 2020-08-20 VITALS — SYSTOLIC BLOOD PRESSURE: 86 MMHG | DIASTOLIC BLOOD PRESSURE: 44 MMHG

## 2020-08-20 VITALS — DIASTOLIC BLOOD PRESSURE: 49 MMHG | SYSTOLIC BLOOD PRESSURE: 110 MMHG

## 2020-08-20 VITALS — SYSTOLIC BLOOD PRESSURE: 98 MMHG | DIASTOLIC BLOOD PRESSURE: 60 MMHG

## 2020-08-20 VITALS — SYSTOLIC BLOOD PRESSURE: 116 MMHG | DIASTOLIC BLOOD PRESSURE: 46 MMHG

## 2020-08-20 VITALS — DIASTOLIC BLOOD PRESSURE: 53 MMHG | SYSTOLIC BLOOD PRESSURE: 106 MMHG

## 2020-08-20 VITALS — DIASTOLIC BLOOD PRESSURE: 51 MMHG | SYSTOLIC BLOOD PRESSURE: 105 MMHG

## 2020-08-20 VITALS — DIASTOLIC BLOOD PRESSURE: 53 MMHG | SYSTOLIC BLOOD PRESSURE: 116 MMHG

## 2020-08-20 VITALS — DIASTOLIC BLOOD PRESSURE: 50 MMHG | SYSTOLIC BLOOD PRESSURE: 107 MMHG

## 2020-08-20 VITALS — DIASTOLIC BLOOD PRESSURE: 57 MMHG | SYSTOLIC BLOOD PRESSURE: 108 MMHG

## 2020-08-20 VITALS — SYSTOLIC BLOOD PRESSURE: 107 MMHG | DIASTOLIC BLOOD PRESSURE: 45 MMHG

## 2020-08-20 VITALS — DIASTOLIC BLOOD PRESSURE: 51 MMHG | SYSTOLIC BLOOD PRESSURE: 99 MMHG

## 2020-08-20 VITALS — DIASTOLIC BLOOD PRESSURE: 33 MMHG | SYSTOLIC BLOOD PRESSURE: 86 MMHG

## 2020-08-20 VITALS — SYSTOLIC BLOOD PRESSURE: 104 MMHG | DIASTOLIC BLOOD PRESSURE: 46 MMHG

## 2020-08-20 VITALS — DIASTOLIC BLOOD PRESSURE: 56 MMHG | SYSTOLIC BLOOD PRESSURE: 115 MMHG

## 2020-08-20 VITALS — DIASTOLIC BLOOD PRESSURE: 61 MMHG | SYSTOLIC BLOOD PRESSURE: 100 MMHG

## 2020-08-20 VITALS — SYSTOLIC BLOOD PRESSURE: 115 MMHG | DIASTOLIC BLOOD PRESSURE: 49 MMHG

## 2020-08-20 VITALS — SYSTOLIC BLOOD PRESSURE: 101 MMHG | DIASTOLIC BLOOD PRESSURE: 52 MMHG

## 2020-08-20 VITALS — SYSTOLIC BLOOD PRESSURE: 106 MMHG | DIASTOLIC BLOOD PRESSURE: 57 MMHG

## 2020-08-20 LAB
ALBUMIN SERPL-MCNC: 2.2 G/DL (ref 3.4–5)
ALP SERPL-CCNC: 274 U/L (ref 45–117)
ALT SERPL-CCNC: 88 U/L (ref 16–61)
ANION GAP SERPL CALCULATED.3IONS-SCNC: 18 MMOL/L (ref 5–15)
BILIRUB SERPL-MCNC: 30.2 MG/DL (ref 0.2–1)
BUN SERPL-MCNC: 88 MG/DL (ref 7–18)
BUN/CREAT SERPL: 21.8
CALCIUM SERPL-MCNC: 8.1 MG/DL (ref 8.5–10.1)
CHLORIDE SERPL-SCNC: 99 MMOL/L (ref 98–107)
CO2 SERPL-SCNC: 21 MMOL/L (ref 21–32)
GLUCOSE SERPL-MCNC: 99 MG/DL (ref 74–106)
HCT VFR BLD AUTO: 19.5 % (ref 41–53)
HGB BLD-MCNC: 6.7 G/DL (ref 13.5–17.5)
MCH RBC QN AUTO: 34.7 PG (ref 28–32)
MCV RBC AUTO: 101.7 FL (ref 80–100)
POTASSIUM SERPL-SCNC: 3.2 MMOL/L (ref 3.5–5.1)
PROT SERPL-MCNC: 4.4 G/DL (ref 6.4–8.2)
SODIUM SERPL-SCNC: 138 MMOL/L (ref 136–145)

## 2020-08-20 PROCEDURE — 5A1D70Z PERFORMANCE OF URINARY FILTRATION, INTERMITTENT, LESS THAN 6 HOURS PER DAY: ICD-10-PCS | Performed by: INTERNAL MEDICINE

## 2020-08-20 RX ADMIN — POTASSIUM CHLORIDE SCH MLS/HR: 200 INJECTION, SOLUTION INTRAVENOUS at 18:02

## 2020-08-20 RX ADMIN — SODIUM CHLORIDE SCH MCG: 9 INJECTION, SOLUTION INTRAVENOUS at 14:00

## 2020-08-20 RX ADMIN — Medication SCH MG: at 09:46

## 2020-08-20 RX ADMIN — MIDODRINE HYDROCHLORIDE SCH MG: 10 TABLET ORAL at 12:00

## 2020-08-20 RX ADMIN — MIDODRINE HYDROCHLORIDE SCH MG: 10 TABLET ORAL at 06:11

## 2020-08-20 RX ADMIN — Medication SCH ML: at 21:45

## 2020-08-20 RX ADMIN — MIDODRINE HYDROCHLORIDE SCH MG: 10 TABLET ORAL at 18:13

## 2020-08-20 RX ADMIN — MEROPENEM SCH MLS/HR: 500 INJECTION INTRAVENOUS at 09:46

## 2020-08-20 RX ADMIN — PANTOPRAZOLE SODIUM SCH MG: 40 INJECTION, POWDER, FOR SOLUTION INTRAVENOUS at 09:45

## 2020-08-20 RX ADMIN — AMIODARONE HYDROCHLORIDE SCH MLS/HR: 50 INJECTION, SOLUTION INTRAVENOUS at 20:53

## 2020-08-20 RX ADMIN — PHENYLEPHRINE HYDROCHLORIDE SCH MLS/HR: 10 INJECTION INTRAVENOUS at 15:15

## 2020-08-20 RX ADMIN — SODIUM CHLORIDE SCH MCG: 9 INJECTION, SOLUTION INTRAVENOUS at 06:12

## 2020-08-20 RX ADMIN — POTASSIUM CHLORIDE SCH MLS/HR: 200 INJECTION, SOLUTION INTRAVENOUS at 15:05

## 2020-08-20 RX ADMIN — SODIUM CHLORIDE SCH MCG: 9 INJECTION, SOLUTION INTRAVENOUS at 21:44

## 2020-08-20 RX ADMIN — Medication SCH ML: at 09:46

## 2020-08-20 RX ADMIN — POTASSIUM CHLORIDE SCH MLS/HR: 200 INJECTION, SOLUTION INTRAVENOUS at 16:29

## 2020-08-20 RX ADMIN — PANTOPRAZOLE SODIUM SCH MG: 40 INJECTION, POWDER, FOR SOLUTION INTRAVENOUS at 21:28

## 2020-08-20 RX ADMIN — Medication SCH MG: at 22:00

## 2020-08-20 RX ADMIN — POTASSIUM CHLORIDE SCH MLS/HR: 200 INJECTION, SOLUTION INTRAVENOUS at 09:45

## 2020-08-20 RX ADMIN — DOPAMINE HYDROCHLORIDE IN DEXTROSE SCH MLS/HR: 1.6 INJECTION, SOLUTION INTRAVENOUS at 10:07

## 2020-08-20 RX ADMIN — PHENYLEPHRINE HYDROCHLORIDE SCH MLS/HR: 10 INJECTION INTRAVENOUS at 20:54

## 2020-08-20 NOTE — NUR
RE: Restraint order



While this RN was on lunch meal break, soft restraint order was received by covering RN d/t 
patient pulling at dialysis lines. MD not available to sign document. Will continue to 
redirect patient to maintain patient safety.

## 2020-08-20 NOTE — NUR
Nutrition Followup Notes



Pt wt is 105.7 kg



Pt was awake, with nurse at time of rounds.  Pt was getting ready to receive dialysis.  Pt 
with 0% po x 3 days per RN note. Per RN pt is refusing food, pt pulling at dialysis lines.  
If medically feasible consider starting pt on TF or TPN d/t to refusal to eat.  If medically 
feasible consider starting pt on Nepro CS 1.8 at 40 ml/hr  



Est energy needs 0149-4246 kcal (14-16 kcal/kg BW 122kg) Est protein needs 106-126g 
(1-1.2g/kg .7kg r/t to Starting HD) Will reassess prn.



LABS:  BUN 88H, Creat 4.04H, Ca 8.1L, Alb 2.2L, T bili 30.2H, AST 254H, ALT 88H, ALK PHOS 
274H



GI: Pt had 2 BMs on 8/20 per RN doc



BS: 13 mod risk. Refer to wound assessment report for full details.



PES:

Altered nutrition related labs r/t current and chronic medical condition aeb pt with 
elevated RFTs, elevated liver enzymes, hypoalb, 



Obesity r/t caloric intake in excess of needs aeb pt with a BMI of 35.5 kg/m2



Comments:  1) Continue to monitor po intake, labs, skin 2) Refer pt to OPD on DC 3) Continue 
current plan of care 

     

Consider Renal Specific 2g Na, 2gK, lowphos,  80g protein diet

## 2020-08-20 NOTE — NUR
RE: Dialysis/blood transfusion



Dialysis RN to be at bedside approximately at 1115. Ordered blood transfusion to be 
transfused with dialysis.

## 2020-08-20 NOTE — NUR
AMIODARONE GTT

DECREASED AMIODARONE GTT TO 16.66ML/HR - 0.5MG/HR.  PATIENT CONTINUES TO BE SR 75-77.

## 2020-08-20 NOTE — NUR
RE: K-rider (2nd bag)



2nd bag of K-rider to be administered after dialysis is completed. Dialysis in process now.

## 2020-08-20 NOTE — NUR
Opening Shift Note

Assumed care of patient, awake and alert x 2-3 patient thought he was in [place], reoriented patient.  No S/S of distress/SOB or pain.  Patient continues on 
amiodarone gtt 1mg/hr to 22g RH patent and intact, k-rider to SASHA midline, and neosynephrine 
gtt 60mcg/hr to JOSE ARMANDO midline patent and intact. Instructed on POC and to call for assist PRN, 
will continue to monitor for changes Q1hr and PRN.

## 2020-08-20 NOTE — NUR
RE: Critical labs



Received critical labs at 0728 on: WBC, Hbg. 

Paged on-call hospitalist at [phone]. 

N.P. informed of critical lab values at 0745. Order received and read back to verify.

## 2020-08-20 NOTE — NUR
WOUND CARE NOTE: IN TO SEE PATIENT AT THIS TIME PER NURSE REQUEST. PATIENT WAS NOTED UPON 
ASSESSMENT TO HAVE A NON BLANCHABLE RIGHT HEEL. WOUND PHOTO WAS TAKEN AT THAT TIME BY 
BEDSIDE NURSE FOR REFERENCE.  PATIENT WAS ADMITTED TO [facility] WITH DIAGNOSIS OF ACUTE RENAL 
FAILURE, HEPATORENAL SYNDROME, HYPONATREMIA, LEUKOCYTOSIS, POSSIBLE SEPSIS, HYPOKALEMIA.  
PATIENT HAS MULTIPLE COMORBIDITIES, INCLUDING ETOH ABUSE, HEPATIC FAILURE, AFIB, HTN, 
OBESITY.  CURRENT ROSELINE SCORE IS 13.  PATIENT HAS BECOME INCREASINGLY WEAKER SINCE HIS 
ADMIT DATE.  CURRENTLY, HE IS RECEIVING HEMODIALYSIS.  HE HAS A NON BLANCHING RED RIGHT 
HEEL.  NO OTHER WOUNDS AT THIS TIME.



RECOMMEND: FREQUENT TURN SCHEDULE Q 2 HOURS, PRN AS CONDITION PERMITS, WITH PRESSURE 
REDISTRIBUTION, USING PILLOWS/WEDGES,SPECIALTY BARIATRIC AIR BED, POSEY FOAM BOOT TO 
BILATERAL FEET/HEELS, SKIN/WOUND CARE PLAN, CONTINUED MONITORING BY WOUND CARE TEAM. 

-------------------------------------------------------------------------------

Addendum: 08/20/20 at 1436 by Angie Posada RN

-------------------------------------------------------------------------------

Amended: Links added.

## 2020-08-20 NOTE — NUR
Spoke with Dr. RENAY Aleman RE: POC



Notified MD of elevated HR, agitation, hallucinations and restraint order by nephrology MD. 
MD verbalized understanding. Restraint order to be signed by hospitalist. Orders received 
and read back to verify.

## 2020-08-20 NOTE — NUR
RE: Restraint order



Soft restraint not needed due to change in status. Patient is not pulling at dialysis lines. 
Will continue to redirect patient.

## 2020-08-20 NOTE — NUR
RE: Status



Patient noted to be in SVT with HR sustaining in the 150-160's. BP 68/42mmHg. Patient opens 
eyes to name. Dialysis RN was removing patient from dialysis machine. LILIANA Jo was at 
bedside and instructed staff to call a code assist. Code assist was called at 1421. Refer to 
code assist report for further documentation.

## 2020-08-20 NOTE — NUR
PT AWAKE BUT WITH EPISODE OF CONFUSION . ABLE TO FOLLOW SIMPLE COMMAND APPROPRIATELY. PT WAS 
STARTED ON AMIODORONE DRIP DUE TO AFIB RVR . NEOSYNEPHRINE WAS ALSO STARTED DUE TO LOW BP. 
WILL CONT TO MONITOR.

## 2020-08-20 NOTE — NUR
Morning note



Patient resting in bed in a high-fowlers position with even and unlabored respirations on 2 
LPM NC, no distress noted. Patient opens eyes to name. Patient's speech is inappropriate to 
situation. Patient is looking to the right and talking. Unclear what patient is saying. No 
one is present at the right side of the bed. Patient reports he is being treated for "liver 
cancer". Patient knows name/, year and president. Patient has generalized jaundice with 
generalized edema. Bilateral posey boots in place to off-load pressure from heels. 
Instructed patient on POC, fall precautions and to call for assistance as needed. Patient 
verbalized understanding although frequent reorientation will be needed d/t confusion. Fall 
precautions in place with call light within reach and bed alarm on for safety.

## 2020-08-21 VITALS — SYSTOLIC BLOOD PRESSURE: 109 MMHG | DIASTOLIC BLOOD PRESSURE: 54 MMHG

## 2020-08-21 VITALS — SYSTOLIC BLOOD PRESSURE: 126 MMHG | DIASTOLIC BLOOD PRESSURE: 49 MMHG

## 2020-08-21 VITALS — SYSTOLIC BLOOD PRESSURE: 112 MMHG | DIASTOLIC BLOOD PRESSURE: 40 MMHG

## 2020-08-21 VITALS — DIASTOLIC BLOOD PRESSURE: 33 MMHG | SYSTOLIC BLOOD PRESSURE: 110 MMHG

## 2020-08-21 VITALS — SYSTOLIC BLOOD PRESSURE: 99 MMHG | DIASTOLIC BLOOD PRESSURE: 44 MMHG

## 2020-08-21 VITALS — SYSTOLIC BLOOD PRESSURE: 128 MMHG | DIASTOLIC BLOOD PRESSURE: 48 MMHG

## 2020-08-21 VITALS — DIASTOLIC BLOOD PRESSURE: 49 MMHG | SYSTOLIC BLOOD PRESSURE: 128 MMHG

## 2020-08-21 VITALS — DIASTOLIC BLOOD PRESSURE: 43 MMHG | SYSTOLIC BLOOD PRESSURE: 107 MMHG

## 2020-08-21 VITALS — SYSTOLIC BLOOD PRESSURE: 129 MMHG | DIASTOLIC BLOOD PRESSURE: 43 MMHG

## 2020-08-21 VITALS — DIASTOLIC BLOOD PRESSURE: 51 MMHG | SYSTOLIC BLOOD PRESSURE: 123 MMHG

## 2020-08-21 VITALS — DIASTOLIC BLOOD PRESSURE: 36 MMHG | SYSTOLIC BLOOD PRESSURE: 106 MMHG

## 2020-08-21 VITALS — SYSTOLIC BLOOD PRESSURE: 105 MMHG | DIASTOLIC BLOOD PRESSURE: 48 MMHG

## 2020-08-21 VITALS — SYSTOLIC BLOOD PRESSURE: 127 MMHG | DIASTOLIC BLOOD PRESSURE: 46 MMHG

## 2020-08-21 VITALS — SYSTOLIC BLOOD PRESSURE: 111 MMHG | DIASTOLIC BLOOD PRESSURE: 33 MMHG

## 2020-08-21 VITALS — DIASTOLIC BLOOD PRESSURE: 38 MMHG | SYSTOLIC BLOOD PRESSURE: 110 MMHG

## 2020-08-21 VITALS — SYSTOLIC BLOOD PRESSURE: 103 MMHG | DIASTOLIC BLOOD PRESSURE: 44 MMHG

## 2020-08-21 VITALS — DIASTOLIC BLOOD PRESSURE: 33 MMHG | SYSTOLIC BLOOD PRESSURE: 106 MMHG

## 2020-08-21 VITALS — SYSTOLIC BLOOD PRESSURE: 102 MMHG | DIASTOLIC BLOOD PRESSURE: 47 MMHG

## 2020-08-21 VITALS — SYSTOLIC BLOOD PRESSURE: 108 MMHG | DIASTOLIC BLOOD PRESSURE: 32 MMHG

## 2020-08-21 VITALS — SYSTOLIC BLOOD PRESSURE: 101 MMHG | DIASTOLIC BLOOD PRESSURE: 42 MMHG

## 2020-08-21 VITALS — SYSTOLIC BLOOD PRESSURE: 113 MMHG | DIASTOLIC BLOOD PRESSURE: 41 MMHG

## 2020-08-21 VITALS — SYSTOLIC BLOOD PRESSURE: 98 MMHG | DIASTOLIC BLOOD PRESSURE: 40 MMHG

## 2020-08-21 VITALS — SYSTOLIC BLOOD PRESSURE: 106 MMHG | DIASTOLIC BLOOD PRESSURE: 33 MMHG

## 2020-08-21 VITALS — SYSTOLIC BLOOD PRESSURE: 109 MMHG | DIASTOLIC BLOOD PRESSURE: 42 MMHG

## 2020-08-21 VITALS — SYSTOLIC BLOOD PRESSURE: 100 MMHG | DIASTOLIC BLOOD PRESSURE: 47 MMHG

## 2020-08-21 VITALS — DIASTOLIC BLOOD PRESSURE: 34 MMHG | SYSTOLIC BLOOD PRESSURE: 101 MMHG

## 2020-08-21 VITALS — SYSTOLIC BLOOD PRESSURE: 116 MMHG | DIASTOLIC BLOOD PRESSURE: 64 MMHG

## 2020-08-21 VITALS — DIASTOLIC BLOOD PRESSURE: 49 MMHG | SYSTOLIC BLOOD PRESSURE: 132 MMHG

## 2020-08-21 VITALS — DIASTOLIC BLOOD PRESSURE: 38 MMHG | SYSTOLIC BLOOD PRESSURE: 106 MMHG

## 2020-08-21 VITALS — DIASTOLIC BLOOD PRESSURE: 37 MMHG | SYSTOLIC BLOOD PRESSURE: 104 MMHG

## 2020-08-21 VITALS — SYSTOLIC BLOOD PRESSURE: 114 MMHG | DIASTOLIC BLOOD PRESSURE: 48 MMHG

## 2020-08-21 VITALS — DIASTOLIC BLOOD PRESSURE: 32 MMHG | SYSTOLIC BLOOD PRESSURE: 108 MMHG

## 2020-08-21 VITALS — SYSTOLIC BLOOD PRESSURE: 116 MMHG | DIASTOLIC BLOOD PRESSURE: 45 MMHG

## 2020-08-21 VITALS — SYSTOLIC BLOOD PRESSURE: 102 MMHG | DIASTOLIC BLOOD PRESSURE: 44 MMHG

## 2020-08-21 VITALS — DIASTOLIC BLOOD PRESSURE: 45 MMHG | SYSTOLIC BLOOD PRESSURE: 126 MMHG

## 2020-08-21 VITALS — DIASTOLIC BLOOD PRESSURE: 49 MMHG | SYSTOLIC BLOOD PRESSURE: 116 MMHG

## 2020-08-21 VITALS — DIASTOLIC BLOOD PRESSURE: 36 MMHG | SYSTOLIC BLOOD PRESSURE: 96 MMHG

## 2020-08-21 VITALS — SYSTOLIC BLOOD PRESSURE: 106 MMHG | DIASTOLIC BLOOD PRESSURE: 35 MMHG

## 2020-08-21 VITALS — SYSTOLIC BLOOD PRESSURE: 103 MMHG | DIASTOLIC BLOOD PRESSURE: 41 MMHG

## 2020-08-21 VITALS — SYSTOLIC BLOOD PRESSURE: 99 MMHG | DIASTOLIC BLOOD PRESSURE: 50 MMHG

## 2020-08-21 VITALS — DIASTOLIC BLOOD PRESSURE: 47 MMHG | SYSTOLIC BLOOD PRESSURE: 126 MMHG

## 2020-08-21 VITALS — DIASTOLIC BLOOD PRESSURE: 48 MMHG | SYSTOLIC BLOOD PRESSURE: 132 MMHG

## 2020-08-21 VITALS — DIASTOLIC BLOOD PRESSURE: 47 MMHG | SYSTOLIC BLOOD PRESSURE: 100 MMHG

## 2020-08-21 VITALS — DIASTOLIC BLOOD PRESSURE: 42 MMHG | SYSTOLIC BLOOD PRESSURE: 92 MMHG

## 2020-08-21 VITALS — SYSTOLIC BLOOD PRESSURE: 113 MMHG | DIASTOLIC BLOOD PRESSURE: 37 MMHG

## 2020-08-21 VITALS — SYSTOLIC BLOOD PRESSURE: 129 MMHG | DIASTOLIC BLOOD PRESSURE: 48 MMHG

## 2020-08-21 VITALS — DIASTOLIC BLOOD PRESSURE: 48 MMHG | SYSTOLIC BLOOD PRESSURE: 98 MMHG

## 2020-08-21 VITALS — SYSTOLIC BLOOD PRESSURE: 103 MMHG | DIASTOLIC BLOOD PRESSURE: 48 MMHG

## 2020-08-21 VITALS — DIASTOLIC BLOOD PRESSURE: 36 MMHG | SYSTOLIC BLOOD PRESSURE: 107 MMHG

## 2020-08-21 VITALS — SYSTOLIC BLOOD PRESSURE: 107 MMHG | DIASTOLIC BLOOD PRESSURE: 43 MMHG

## 2020-08-21 VITALS — SYSTOLIC BLOOD PRESSURE: 101 MMHG | DIASTOLIC BLOOD PRESSURE: 34 MMHG

## 2020-08-21 VITALS — SYSTOLIC BLOOD PRESSURE: 100 MMHG | DIASTOLIC BLOOD PRESSURE: 45 MMHG

## 2020-08-21 VITALS — SYSTOLIC BLOOD PRESSURE: 109 MMHG | DIASTOLIC BLOOD PRESSURE: 36 MMHG

## 2020-08-21 VITALS — DIASTOLIC BLOOD PRESSURE: 37 MMHG | SYSTOLIC BLOOD PRESSURE: 94 MMHG

## 2020-08-21 VITALS — SYSTOLIC BLOOD PRESSURE: 125 MMHG | DIASTOLIC BLOOD PRESSURE: 42 MMHG

## 2020-08-21 VITALS — DIASTOLIC BLOOD PRESSURE: 44 MMHG | SYSTOLIC BLOOD PRESSURE: 108 MMHG

## 2020-08-21 VITALS — DIASTOLIC BLOOD PRESSURE: 44 MMHG | SYSTOLIC BLOOD PRESSURE: 94 MMHG

## 2020-08-21 VITALS — SYSTOLIC BLOOD PRESSURE: 106 MMHG | DIASTOLIC BLOOD PRESSURE: 40 MMHG

## 2020-08-21 VITALS — DIASTOLIC BLOOD PRESSURE: 44 MMHG | SYSTOLIC BLOOD PRESSURE: 110 MMHG

## 2020-08-21 VITALS — DIASTOLIC BLOOD PRESSURE: 55 MMHG | SYSTOLIC BLOOD PRESSURE: 127 MMHG

## 2020-08-21 VITALS — SYSTOLIC BLOOD PRESSURE: 99 MMHG | DIASTOLIC BLOOD PRESSURE: 46 MMHG

## 2020-08-21 VITALS — SYSTOLIC BLOOD PRESSURE: 130 MMHG | DIASTOLIC BLOOD PRESSURE: 40 MMHG

## 2020-08-21 VITALS — DIASTOLIC BLOOD PRESSURE: 47 MMHG | SYSTOLIC BLOOD PRESSURE: 106 MMHG

## 2020-08-21 VITALS — DIASTOLIC BLOOD PRESSURE: 55 MMHG | SYSTOLIC BLOOD PRESSURE: 120 MMHG

## 2020-08-21 VITALS — SYSTOLIC BLOOD PRESSURE: 133 MMHG | DIASTOLIC BLOOD PRESSURE: 46 MMHG

## 2020-08-21 VITALS — DIASTOLIC BLOOD PRESSURE: 42 MMHG | SYSTOLIC BLOOD PRESSURE: 129 MMHG

## 2020-08-21 VITALS — SYSTOLIC BLOOD PRESSURE: 129 MMHG | DIASTOLIC BLOOD PRESSURE: 51 MMHG

## 2020-08-21 VITALS — DIASTOLIC BLOOD PRESSURE: 47 MMHG | SYSTOLIC BLOOD PRESSURE: 104 MMHG

## 2020-08-21 VITALS — SYSTOLIC BLOOD PRESSURE: 122 MMHG | DIASTOLIC BLOOD PRESSURE: 41 MMHG

## 2020-08-21 VITALS — SYSTOLIC BLOOD PRESSURE: 111 MMHG | DIASTOLIC BLOOD PRESSURE: 41 MMHG

## 2020-08-21 VITALS — SYSTOLIC BLOOD PRESSURE: 112 MMHG | DIASTOLIC BLOOD PRESSURE: 44 MMHG

## 2020-08-21 VITALS — DIASTOLIC BLOOD PRESSURE: 45 MMHG | SYSTOLIC BLOOD PRESSURE: 131 MMHG

## 2020-08-21 VITALS — SYSTOLIC BLOOD PRESSURE: 108 MMHG | DIASTOLIC BLOOD PRESSURE: 36 MMHG

## 2020-08-21 VITALS — SYSTOLIC BLOOD PRESSURE: 101 MMHG | DIASTOLIC BLOOD PRESSURE: 37 MMHG

## 2020-08-21 VITALS — SYSTOLIC BLOOD PRESSURE: 105 MMHG | DIASTOLIC BLOOD PRESSURE: 32 MMHG

## 2020-08-21 VITALS — DIASTOLIC BLOOD PRESSURE: 45 MMHG | SYSTOLIC BLOOD PRESSURE: 119 MMHG

## 2020-08-21 VITALS — DIASTOLIC BLOOD PRESSURE: 40 MMHG | SYSTOLIC BLOOD PRESSURE: 106 MMHG

## 2020-08-21 VITALS — SYSTOLIC BLOOD PRESSURE: 126 MMHG | DIASTOLIC BLOOD PRESSURE: 46 MMHG

## 2020-08-21 VITALS — SYSTOLIC BLOOD PRESSURE: 138 MMHG | DIASTOLIC BLOOD PRESSURE: 52 MMHG

## 2020-08-21 VITALS — DIASTOLIC BLOOD PRESSURE: 46 MMHG | SYSTOLIC BLOOD PRESSURE: 126 MMHG

## 2020-08-21 VITALS — SYSTOLIC BLOOD PRESSURE: 103 MMHG | DIASTOLIC BLOOD PRESSURE: 50 MMHG

## 2020-08-21 VITALS — DIASTOLIC BLOOD PRESSURE: 47 MMHG | SYSTOLIC BLOOD PRESSURE: 130 MMHG

## 2020-08-21 VITALS — DIASTOLIC BLOOD PRESSURE: 41 MMHG | SYSTOLIC BLOOD PRESSURE: 120 MMHG

## 2020-08-21 VITALS — DIASTOLIC BLOOD PRESSURE: 49 MMHG | SYSTOLIC BLOOD PRESSURE: 126 MMHG

## 2020-08-21 VITALS — SYSTOLIC BLOOD PRESSURE: 95 MMHG | DIASTOLIC BLOOD PRESSURE: 44 MMHG

## 2020-08-21 VITALS — SYSTOLIC BLOOD PRESSURE: 122 MMHG | DIASTOLIC BLOOD PRESSURE: 36 MMHG

## 2020-08-21 LAB
ANION GAP SERPL CALCULATED.3IONS-SCNC: 17 MMOL/L (ref 5–15)
APTT PPP: 63.1 SEC (ref 23–31.2)
BUN SERPL-MCNC: 79 MG/DL (ref 7–18)
BUN/CREAT SERPL: 18.9
CALCIUM SERPL-MCNC: 8.6 MG/DL (ref 8.5–10.1)
CHLORIDE SERPL-SCNC: 101 MMOL/L (ref 98–107)
CO2 SERPL-SCNC: 20 MMOL/L (ref 21–32)
GLUCOSE SERPL-MCNC: 123 MG/DL (ref 74–106)
HCT VFR BLD AUTO: 19.6 % (ref 41–53)
HCT VFR BLD AUTO: 23.2 % (ref 41–53)
HGB BLD-MCNC: 6.8 G/DL (ref 13.5–17.5)
HGB BLD-MCNC: 7.5 G/DL (ref 13.5–17.5)
INR PPP: 1.87 (ref 0.9–1.15)
MCH RBC QN AUTO: 34.7 PG (ref 28–32)
MCH RBC QN AUTO: 35.1 PG (ref 28–32)
MCV RBC AUTO: 100.8 FL (ref 80–100)
MCV RBC AUTO: 106.9 FL (ref 80–100)
NRBC BLD QL AUTO: 1 %
POTASSIUM SERPL-SCNC: 4.3 MMOL/L (ref 3.5–5.1)
SODIUM SERPL-SCNC: 138 MMOL/L (ref 136–145)

## 2020-08-21 RX ADMIN — MIDODRINE HYDROCHLORIDE SCH MG: 10 TABLET ORAL at 12:09

## 2020-08-21 RX ADMIN — Medication SCH MG: at 09:51

## 2020-08-21 RX ADMIN — PANTOPRAZOLE SODIUM SCH MLS/HR: 40 INJECTION, POWDER, FOR SOLUTION INTRAVENOUS at 01:34

## 2020-08-21 RX ADMIN — PANTOPRAZOLE SODIUM SCH MLS/HR: 40 INJECTION, POWDER, FOR SOLUTION INTRAVENOUS at 06:37

## 2020-08-21 RX ADMIN — ONDANSETRON HYDROCHLORIDE PRN MG: 2 INJECTION, SOLUTION INTRAMUSCULAR; INTRAVENOUS at 04:38

## 2020-08-21 RX ADMIN — MEROPENEM SCH MLS/HR: 500 INJECTION INTRAVENOUS at 09:51

## 2020-08-21 RX ADMIN — AMIODARONE HYDROCHLORIDE SCH MLS/HR: 50 INJECTION, SOLUTION INTRAVENOUS at 11:49

## 2020-08-21 RX ADMIN — PANTOPRAZOLE SODIUM SCH MLS/HR: 40 INJECTION, POWDER, FOR SOLUTION INTRAVENOUS at 21:15

## 2020-08-21 RX ADMIN — SODIUM CHLORIDE SCH MCG: 9 INJECTION, SOLUTION INTRAVENOUS at 21:35

## 2020-08-21 RX ADMIN — Medication SCH MG: at 21:34

## 2020-08-21 RX ADMIN — Medication SCH ML: at 09:51

## 2020-08-21 RX ADMIN — AMIODARONE HYDROCHLORIDE SCH MLS/HR: 50 INJECTION, SOLUTION INTRAVENOUS at 16:59

## 2020-08-21 RX ADMIN — MIDODRINE HYDROCHLORIDE SCH MG: 10 TABLET ORAL at 06:26

## 2020-08-21 RX ADMIN — PANTOPRAZOLE SODIUM SCH MLS/HR: 40 INJECTION, POWDER, FOR SOLUTION INTRAVENOUS at 18:30

## 2020-08-21 RX ADMIN — ONDANSETRON HYDROCHLORIDE PRN MG: 2 INJECTION, SOLUTION INTRAMUSCULAR; INTRAVENOUS at 00:09

## 2020-08-21 RX ADMIN — LORAZEPAM PRN MG: 2 INJECTION, SOLUTION INTRAMUSCULAR; INTRAVENOUS at 22:57

## 2020-08-21 RX ADMIN — PHENYLEPHRINE HYDROCHLORIDE SCH MLS/HR: 10 INJECTION INTRAVENOUS at 10:38

## 2020-08-21 RX ADMIN — MIDODRINE HYDROCHLORIDE SCH MG: 10 TABLET ORAL at 18:30

## 2020-08-21 RX ADMIN — SODIUM CHLORIDE SCH MCG: 9 INJECTION, SOLUTION INTRAVENOUS at 06:27

## 2020-08-21 RX ADMIN — Medication SCH ML: at 21:33

## 2020-08-21 RX ADMIN — PHENYLEPHRINE HYDROCHLORIDE SCH MLS/HR: 10 INJECTION INTRAVENOUS at 18:31

## 2020-08-21 RX ADMIN — PHENYLEPHRINE HYDROCHLORIDE SCH MLS/HR: 10 INJECTION INTRAVENOUS at 07:55

## 2020-08-21 RX ADMIN — PANTOPRAZOLE SODIUM SCH MLS/HR: 40 INJECTION, POWDER, FOR SOLUTION INTRAVENOUS at 11:27

## 2020-08-21 RX ADMIN — DOPAMINE HYDROCHLORIDE IN DEXTROSE SCH MLS/HR: 1.6 INJECTION, SOLUTION INTRAVENOUS at 09:52

## 2020-08-21 RX ADMIN — SODIUM CHLORIDE SCH MCG: 9 INJECTION, SOLUTION INTRAVENOUS at 15:12

## 2020-08-21 NOTE — NUR
FAMILY

PT MOTHER CALLED FOR UPDATE. ALL QUESTIONS AND CONCERNS ADDRESSED. PT MOTHER REQUESTING TO 
SPEAK WITH MD.  STATED HE WOULD CALL FAMILY WHEN HE WAS AT BEDSIDE.

## 2020-08-21 NOTE — NUR
ALL FAMILY MEMBERS LEFT FOR THE NIGHT. PT BELONGINGS SENT HOME WITH HIS MOTHER. MOTHER IS 
AWARE SHE IS MEETING WITH DR. GAMA TOMORROW AM AND SHE WILL BE ABLE TO VISIT PT PER MD. 
MOTHER VERBALIZED UNDERSTANDING OF CURRENT PLAN OF CARE. ALL QUESTIONS AND CONCERNS 
ADDRESSED AT THIS TIME. THIS RN TOLD HER SHE WILL BE CALLED FOR ANY CRITICAL CHANGES IN PT 
CONDITION.

## 2020-08-21 NOTE — NUR
WOUND CARE PHOTO

WOUND CARE PHOTOS OBTAINED OF BUTTOCK SKIN TEARS. Z-GUARD APPLIED. PARTIAL LINEN CHANGE 
COMPLETE. PT TOLERATED FAIR.

## 2020-08-21 NOTE — NUR
NEOSYNEPRINE GTT

INCREASED PT ARNIE GTT TO 70 MCG/HR - 52.5 MG/HR PT HYPOTENSIVE @ 92/42 HR 77.  PATIENT 
CURRENT /41 HR 77.

## 2020-08-21 NOTE — NUR
MD UPDATE

MILEY SANABRIA CALLED REGARDING PATIENTS LABS AND EMESIS. MD ORDERED FOR ONE UNIT PRBC 
ADMINISTRATION AFTER PLATELET ADMINISTRATION IS COMPLETE.

## 2020-08-21 NOTE — NUR
MD VISIT

 AT BEDSIDE SPEAKING WITH PATIENT MOTHER REGARDING COMPANIONATE WEANING OF 
VASOPRESSOR THERAPY. MOTHER SIGNED DNR FORM. ALL FAMILY MEMBERS AT BEDSIDE.

## 2020-08-21 NOTE — NUR
NEW BAG HUNG.  PATIENT CURRENT /49 HR 78.  DECREASED RATE TO 50MCG/MIN.  CURRENT BP 
116/49 HR 77 MAP 72

## 2020-08-21 NOTE — NUR
PLASMA

RECEIVED ORDER TO TRANSFUSE 1 UNIT OF PLASMA, VERIFIED WITH NURSE VASQUEZ PATIENT VITALS 
STABLE.  PATIENT NO ADVERSE REACTIONS TO PLASMA.  WILL CONTINUE TO MONITOR PATIENT.

## 2020-08-21 NOTE — NUR
MD VISIT

DR.SIDDIQUI SANDOVAL AT BEDSIDE. MD AWARE OF LAB VALUES AND IV DRIPS. MD GOING TO REVIEW 
PATIENT MEDICAL CHART.

## 2020-08-21 NOTE — NUR
PT TRANSFER TO ICU

KENYON, YOSELIN INDERJIT transferred to 112 via bed on cardiac monitor and portable 02 at 2 L 
nasal cannula. All patient personal belongings transferred with patient. VS remain stable 
during transport. PT connected to bedside monitor. Full linen and gown change complete. PT 
repositioned on side. Bed locked in lowest position, call light within reach. PT instructed 
to call for assistance. PT verbalized understanding.

## 2020-08-21 NOTE — NUR
INITIAL CONTACT

PT OBSERVED RESTING IN BED, ON SPECIALTY AIR MATTRESS. PT DOES NOT APPEAR TO BE IN ANY 
DISTRESS AT THIS TIME. PT RAMBLING TO SELF CONTINUOUSLY. PT ALERT AND ORIENTED TO SELF, 
LOCATION, AND IS ANSWERS APPROPRIATELY TO QUESTIONS. PT ABLE TO FOLLOW SIMPLE COMMANDS. HAD 
FREQUENT PERIODS OF CONFUSION. AFEBRILE. PULSES PALPABLE RADIAL AND PEDAL BILATERALLY. EDEMA 
NOTED ON UPPER AND LOWER EXTREMITIES. SINUS RHYTHM 70'S NOTED ON BEDSIDE MONITOR. PT ON 
AMIODARONE, ARNIE-SYNEPHRINE, AND PROTONIX DRIP. SEE IV SPREADSHEET. LUNG SOUNDS DIMINISHED 
ANTERIOR LUNG KAUFMAN. PT ON 2 L NASAL CANNULA, DENIES SHORTNESS OF BREATH. OXYGEN SATURATION 
99%. ABDOMEN LARGE, DISTENDED, AND SOFT. BOWEL SOUNDS NOTED. PT INCONTINENT OF STOOL. 
REPORTED TO HAVE HAD EPISODE OF EMESIS DURING MORNING. PT DENIES NAUSEA AT THIS TIME. SEE 
SKIN/WOUND ASSESSMENT. POSEY BOOTS ON LOWER EXTREMITIES. PT ON FREQUENT TURING SCHEDULE. 
ALARMS IN PLACE. WILL CONTINUE TO MONITOR.

## 2020-08-21 NOTE — NUR
FAMILY AT BEDSIDE



ALL PRESENT FAMILY MEMBERS AT BEDSIDE. UPDATED THEM ON PT CONDITION AND CURRENT PLAN OF 
CARE. EDUCATED ON VISITING RESTRICTIONS AT THIS TIME.

## 2020-08-21 NOTE — NUR
BLOODY EMESIS

PATIENT HAD ANOTHER EPISODE OF BLOODY EMESIS, DARK MAROON/RED THICK EMESIS.  KEPT PATIENT 
NPO.  ZOFRAN 4MG IV GIVEN @ 0430 THIS AM.  PATIENT LYING HOB ASPIRATION PRECAUTIONS 
MAINTAINED.  PATIENT DR SALVADOR WILSON FOR [facility], AWAITING CALL BACK.

## 2020-08-21 NOTE — NUR
CODE STATUS

SPOKE WITH  AND PATIENTS MOTHER ON THE PHONE REGARDING CODE STATUS. MOTHER WISHES 
TO CHANGE CODE STATUS TO DNI/DNI. MD PLACING ORDER FOR SOCIAL SERVICE TO DISCHARGE HOME WITH 
HOSPICE. OK TO ALLOW FAMILY TO VISIT TODAY.

## 2020-08-21 NOTE — NUR
OPENING NOTE

PT OBSERVED RESTING IN BED, ON SPECIALTY AIR MATTRESS. PT DOES NOT APPEAR TO BE IN ANY 
DISTRESS AT THIS TIME. PT ALERT AND ORIENTED TO SELF, LOCATION, AND ANSWERS APPROPRIATELY TO 
QUESTIONS. PT ABLE TO FOLLOW SIMPLE COMMANDS. HAD FREQUENT PERIODS OF CONFUSION PER DAYSHIFT 
RN. AFEBRILE. 1 UNIT PRBCS INFUSING  ML/HR. PULSES PALPABLE RADIAL AND PEDAL 
BILATERALLY. EDEMA NOTED ON UPPER AND LOWER EXTREMITIES. SINUS RHYTHM 70'S NOTED ON BEDSIDE 
MONITOR. PT ON AMIODARONE, ARNIE-SYNEPHRINE, AND PROTONIX DRIP. SEE IV SPREADSHEET. LUNG 
SOUNDS DIMINISHED ANTERIOR LUNG KAUFMAN. PT ON 2 L NASAL CANNULA, DENIES SHORTNESS OF BREATH. 
OXYGEN SATURATION 99%. ABDOMEN LARGE, DISTENDED, AND SOFT. BOWEL SOUNDS NOTED. PT 
INCONTINENT OF STOOL. REPORTED TO HAVE HAD EPISODE OF EMESIS DURING MORNING TIME. PT DENIES 
NAUSEA AND PAIN AT THIS TIME. SEE SKIN/WOUND ASSESSMENT. POSEY BOOTS ON LOWER EXTREMITIES. 
PT ON FREQUENT TURING SCHEDULE. ALARMS IN PLACE. WILL CONTINUE TO MONITOR.

## 2020-08-21 NOTE — NUR
BLOODY EMESIS

PATIENT HAD 100ML OF MAROON/BLOODY EMESIS.  ZOFRAN 4MG IV GIVEN, PATIENT IN HIGH FOWLERS.  
PATIENT VITALS STABLE 109/60 HR 79 O2SAT 97% ON 3LNC.  

-------------------------------------------------------------------------------

Addendum: 08/21/20 at 0024 by ANDREA PAYTON RN RN

-------------------------------------------------------------------------------

HOSPITALIST NILESHD 

-------------------------------------------------------------------------------

Addendum: 08/21/20 at 0108 by ANDREA PAYTON RN RN

-------------------------------------------------------------------------------

RECEIVED ORDER FOR STAT CBC PT PTT

-------------------------------------------------------------------------------

Addendum: 08/21/20 at 0351 by ANDREA PAYTON RN RN

-------------------------------------------------------------------------------

RECEIVED ORDER TO INFUSE PLATELET AND FFP.  CALLED LAB AWAITING WHEN READY

## 2020-08-21 NOTE — NUR
P.O. MEDICATION

PT ABLE TO SWALLOW APPLE SAUCE AND LIQUID MEDICATION WITHOUT DIFFICULTY. NO COUGHING NOTED. 
PT POSITIONED IN HIGH FOWLERS FOR ASPIRATION PRECAUTIONS.

## 2020-08-22 VITALS — SYSTOLIC BLOOD PRESSURE: 117 MMHG | DIASTOLIC BLOOD PRESSURE: 52 MMHG

## 2020-08-22 VITALS — SYSTOLIC BLOOD PRESSURE: 120 MMHG | DIASTOLIC BLOOD PRESSURE: 43 MMHG

## 2020-08-22 VITALS — SYSTOLIC BLOOD PRESSURE: 118 MMHG | DIASTOLIC BLOOD PRESSURE: 46 MMHG

## 2020-08-22 VITALS — SYSTOLIC BLOOD PRESSURE: 134 MMHG | DIASTOLIC BLOOD PRESSURE: 39 MMHG

## 2020-08-22 VITALS — SYSTOLIC BLOOD PRESSURE: 123 MMHG | DIASTOLIC BLOOD PRESSURE: 43 MMHG

## 2020-08-22 VITALS — DIASTOLIC BLOOD PRESSURE: 37 MMHG | SYSTOLIC BLOOD PRESSURE: 101 MMHG

## 2020-08-22 VITALS — DIASTOLIC BLOOD PRESSURE: 46 MMHG | SYSTOLIC BLOOD PRESSURE: 130 MMHG

## 2020-08-22 VITALS — SYSTOLIC BLOOD PRESSURE: 130 MMHG | DIASTOLIC BLOOD PRESSURE: 43 MMHG

## 2020-08-22 VITALS — DIASTOLIC BLOOD PRESSURE: 45 MMHG | SYSTOLIC BLOOD PRESSURE: 104 MMHG

## 2020-08-22 VITALS — DIASTOLIC BLOOD PRESSURE: 32 MMHG | SYSTOLIC BLOOD PRESSURE: 126 MMHG

## 2020-08-22 VITALS — SYSTOLIC BLOOD PRESSURE: 120 MMHG | DIASTOLIC BLOOD PRESSURE: 39 MMHG

## 2020-08-22 VITALS — DIASTOLIC BLOOD PRESSURE: 42 MMHG | SYSTOLIC BLOOD PRESSURE: 105 MMHG

## 2020-08-22 VITALS — SYSTOLIC BLOOD PRESSURE: 141 MMHG | DIASTOLIC BLOOD PRESSURE: 41 MMHG

## 2020-08-22 VITALS — SYSTOLIC BLOOD PRESSURE: 106 MMHG | DIASTOLIC BLOOD PRESSURE: 34 MMHG

## 2020-08-22 VITALS — SYSTOLIC BLOOD PRESSURE: 125 MMHG | DIASTOLIC BLOOD PRESSURE: 35 MMHG

## 2020-08-22 VITALS — SYSTOLIC BLOOD PRESSURE: 107 MMHG | DIASTOLIC BLOOD PRESSURE: 37 MMHG

## 2020-08-22 VITALS — DIASTOLIC BLOOD PRESSURE: 35 MMHG | SYSTOLIC BLOOD PRESSURE: 103 MMHG

## 2020-08-22 VITALS — DIASTOLIC BLOOD PRESSURE: 35 MMHG | SYSTOLIC BLOOD PRESSURE: 104 MMHG

## 2020-08-22 VITALS — DIASTOLIC BLOOD PRESSURE: 55 MMHG | SYSTOLIC BLOOD PRESSURE: 116 MMHG

## 2020-08-22 VITALS — DIASTOLIC BLOOD PRESSURE: 35 MMHG | SYSTOLIC BLOOD PRESSURE: 99 MMHG

## 2020-08-22 VITALS — DIASTOLIC BLOOD PRESSURE: 39 MMHG | SYSTOLIC BLOOD PRESSURE: 102 MMHG

## 2020-08-22 VITALS — DIASTOLIC BLOOD PRESSURE: 39 MMHG | SYSTOLIC BLOOD PRESSURE: 121 MMHG

## 2020-08-22 VITALS — DIASTOLIC BLOOD PRESSURE: 41 MMHG | SYSTOLIC BLOOD PRESSURE: 110 MMHG

## 2020-08-22 VITALS — SYSTOLIC BLOOD PRESSURE: 98 MMHG | DIASTOLIC BLOOD PRESSURE: 41 MMHG

## 2020-08-22 VITALS — DIASTOLIC BLOOD PRESSURE: 36 MMHG | SYSTOLIC BLOOD PRESSURE: 100 MMHG

## 2020-08-22 VITALS — SYSTOLIC BLOOD PRESSURE: 125 MMHG | DIASTOLIC BLOOD PRESSURE: 41 MMHG

## 2020-08-22 VITALS — SYSTOLIC BLOOD PRESSURE: 112 MMHG | DIASTOLIC BLOOD PRESSURE: 34 MMHG

## 2020-08-22 VITALS — SYSTOLIC BLOOD PRESSURE: 94 MMHG | DIASTOLIC BLOOD PRESSURE: 32 MMHG

## 2020-08-22 VITALS — DIASTOLIC BLOOD PRESSURE: 41 MMHG | SYSTOLIC BLOOD PRESSURE: 108 MMHG

## 2020-08-22 VITALS — DIASTOLIC BLOOD PRESSURE: 39 MMHG | SYSTOLIC BLOOD PRESSURE: 108 MMHG

## 2020-08-22 VITALS — SYSTOLIC BLOOD PRESSURE: 100 MMHG | DIASTOLIC BLOOD PRESSURE: 35 MMHG

## 2020-08-22 VITALS — SYSTOLIC BLOOD PRESSURE: 121 MMHG | DIASTOLIC BLOOD PRESSURE: 46 MMHG

## 2020-08-22 VITALS — SYSTOLIC BLOOD PRESSURE: 118 MMHG | DIASTOLIC BLOOD PRESSURE: 37 MMHG

## 2020-08-22 VITALS — SYSTOLIC BLOOD PRESSURE: 99 MMHG | DIASTOLIC BLOOD PRESSURE: 39 MMHG

## 2020-08-22 VITALS — SYSTOLIC BLOOD PRESSURE: 128 MMHG | DIASTOLIC BLOOD PRESSURE: 55 MMHG

## 2020-08-22 VITALS — DIASTOLIC BLOOD PRESSURE: 50 MMHG | SYSTOLIC BLOOD PRESSURE: 137 MMHG

## 2020-08-22 VITALS — SYSTOLIC BLOOD PRESSURE: 123 MMHG | DIASTOLIC BLOOD PRESSURE: 39 MMHG

## 2020-08-22 VITALS — DIASTOLIC BLOOD PRESSURE: 37 MMHG | SYSTOLIC BLOOD PRESSURE: 115 MMHG

## 2020-08-22 VITALS — DIASTOLIC BLOOD PRESSURE: 44 MMHG | SYSTOLIC BLOOD PRESSURE: 98 MMHG

## 2020-08-22 VITALS — DIASTOLIC BLOOD PRESSURE: 34 MMHG | SYSTOLIC BLOOD PRESSURE: 96 MMHG

## 2020-08-22 VITALS — SYSTOLIC BLOOD PRESSURE: 84 MMHG | DIASTOLIC BLOOD PRESSURE: 32 MMHG

## 2020-08-22 VITALS — DIASTOLIC BLOOD PRESSURE: 31 MMHG | SYSTOLIC BLOOD PRESSURE: 99 MMHG

## 2020-08-22 VITALS — DIASTOLIC BLOOD PRESSURE: 42 MMHG | SYSTOLIC BLOOD PRESSURE: 123 MMHG

## 2020-08-22 VITALS — DIASTOLIC BLOOD PRESSURE: 50 MMHG | SYSTOLIC BLOOD PRESSURE: 117 MMHG

## 2020-08-22 VITALS — SYSTOLIC BLOOD PRESSURE: 115 MMHG | DIASTOLIC BLOOD PRESSURE: 36 MMHG

## 2020-08-22 VITALS — DIASTOLIC BLOOD PRESSURE: 29 MMHG | SYSTOLIC BLOOD PRESSURE: 102 MMHG

## 2020-08-22 VITALS — SYSTOLIC BLOOD PRESSURE: 102 MMHG | DIASTOLIC BLOOD PRESSURE: 29 MMHG

## 2020-08-22 VITALS — SYSTOLIC BLOOD PRESSURE: 125 MMHG | DIASTOLIC BLOOD PRESSURE: 29 MMHG

## 2020-08-22 VITALS — SYSTOLIC BLOOD PRESSURE: 93 MMHG | DIASTOLIC BLOOD PRESSURE: 37 MMHG

## 2020-08-22 VITALS — DIASTOLIC BLOOD PRESSURE: 39 MMHG | SYSTOLIC BLOOD PRESSURE: 93 MMHG

## 2020-08-22 VITALS — DIASTOLIC BLOOD PRESSURE: 33 MMHG | SYSTOLIC BLOOD PRESSURE: 110 MMHG

## 2020-08-22 VITALS — SYSTOLIC BLOOD PRESSURE: 109 MMHG | DIASTOLIC BLOOD PRESSURE: 46 MMHG

## 2020-08-22 LAB
ALBUMIN SERPL-MCNC: 2.4 G/DL (ref 3.4–5)
ALP SERPL-CCNC: 284 U/L (ref 45–117)
ALT SERPL-CCNC: 67 U/L (ref 16–61)
ANION GAP SERPL CALCULATED.3IONS-SCNC: 16 MMOL/L (ref 5–15)
BILIRUB SERPL-MCNC: 34.3 MG/DL (ref 0.2–1)
BUN SERPL-MCNC: 97 MG/DL (ref 7–18)
BUN/CREAT SERPL: 19
CALCIUM SERPL-MCNC: 9.2 MG/DL (ref 8.5–10.1)
CHLORIDE SERPL-SCNC: 101 MMOL/L (ref 98–107)
CO2 SERPL-SCNC: 20 MMOL/L (ref 21–32)
GLUCOSE SERPL-MCNC: 129 MG/DL (ref 74–106)
HCT VFR BLD AUTO: 19.4 % (ref 41–53)
HGB BLD-MCNC: 6.8 G/DL (ref 13.5–17.5)
MCH RBC QN AUTO: 34.4 PG (ref 28–32)
MCV RBC AUTO: 98.6 FL (ref 80–100)
POTASSIUM SERPL-SCNC: 4.3 MMOL/L (ref 3.5–5.1)
PROT SERPL-MCNC: 4.7 G/DL (ref 6.4–8.2)
SODIUM SERPL-SCNC: 137 MMOL/L (ref 136–145)

## 2020-08-22 RX ADMIN — PANTOPRAZOLE SODIUM SCH MLS/HR: 40 INJECTION, POWDER, FOR SOLUTION INTRAVENOUS at 12:28

## 2020-08-22 RX ADMIN — MIDODRINE HYDROCHLORIDE SCH MG: 10 TABLET ORAL at 12:00

## 2020-08-22 RX ADMIN — Medication SCH MG: at 10:00

## 2020-08-22 RX ADMIN — AMIODARONE HYDROCHLORIDE SCH MLS/HR: 50 INJECTION, SOLUTION INTRAVENOUS at 07:46

## 2020-08-22 RX ADMIN — SODIUM CHLORIDE SCH MLS/HR: 0.9 INJECTION, SOLUTION INTRAVENOUS at 03:54

## 2020-08-22 RX ADMIN — MEROPENEM SCH MLS/HR: 500 INJECTION INTRAVENOUS at 10:11

## 2020-08-22 RX ADMIN — PANTOPRAZOLE SODIUM SCH MLS/HR: 40 INJECTION, POWDER, FOR SOLUTION INTRAVENOUS at 08:03

## 2020-08-22 RX ADMIN — SODIUM CHLORIDE SCH MLS/HR: 0.9 INJECTION, SOLUTION INTRAVENOUS at 12:23

## 2020-08-22 RX ADMIN — MIDODRINE HYDROCHLORIDE SCH MG: 10 TABLET ORAL at 05:18

## 2020-08-22 RX ADMIN — PANTOPRAZOLE SODIUM SCH MLS/HR: 40 INJECTION, POWDER, FOR SOLUTION INTRAVENOUS at 03:54

## 2020-08-22 RX ADMIN — LORAZEPAM PRN MG: 2 INJECTION, SOLUTION INTRAMUSCULAR; INTRAVENOUS at 05:18

## 2020-08-22 RX ADMIN — DOPAMINE HYDROCHLORIDE IN DEXTROSE SCH MLS/HR: 1.6 INJECTION, SOLUTION INTRAVENOUS at 10:15

## 2020-08-22 RX ADMIN — Medication SCH ML: at 10:00

## 2020-08-22 RX ADMIN — SODIUM CHLORIDE SCH MCG: 9 INJECTION, SOLUTION INTRAVENOUS at 05:47

## 2020-08-22 NOTE — NUR
DR. LOVELL IN ROUNDING ON PT. HE ORDERED TO TURN OFF NORSYNEPHRINE PT . DNR. PT IS 
COMATOSE. PT'S FAMILY ONLY WANTS COMFORT CARE AND ARE TRYINTG TO SEE IF PT CAN BE TAKEN HOME 
ON HOSPICE OR IF PT WILL  IN HOSPITAL.

## 2020-08-22 NOTE — NUR
FOLLOW UP 



THIS RN RECEIVED PHONE NUMBERS FROM [facility] TO CALL FOR . ATTEMPTED TO CALL ALL 3 
WITH NO ANSWER.



CALLED AT 0706: [facility] [phone]

CALLED AT 0707: [facility] [phone]

CALLED AT 0710: [facility] [phone] 



MADE DAYSHIFT RN AWARE OF MOTHERS WISHES AND ATTEMPTS TO GET A  TO READ PT HIS LAST 
RIGHTS.

## 2020-08-22 NOTE — NUR
PT ON 15L NON REBREATHER DUE TO DECREASING 02 SATURATION AND INCREASED RR. CALLED MOTHER, 
PTS NEXT OF KIN TO CLARIFY HOW AGGRESSIVE SHE WOULD LIKE TO BE IN HER SONS TREATMENT. 
EXPLAINED 02 DELIVERY METHODS AND MOTHER AGREES TO BIPAP IF MD SEES FIT. UPDATED HER ON 
EVENTS THROUGHOUT THE NIGHT AND PTS DECREASING NEUROLOGICAL AND RESPIRATORY STATUS.

## 2020-08-22 NOTE — NUR
PT CARE



GAVE PT CHG BATH, GOWN AND PARTIAL KAROL CHANGE. PT NOW ON 6L SIMPLE MASK DUE TO HIM 
FREQUENTLY TURNING HIS HEAD AND THE NASAL CANNULA NOT STAYING IN PLACE AND PT DESATURATING 
TO HIGH 80'S. VITAL SIGNS STABLE. ORAL CARE DONE AND PT TURNED.

## 2020-08-22 NOTE — NUR
PT HAS BEEN CLEARED BY BOTH [facility] AND [facility]. HOUSE SUPERVISOR AWARE. PT WILL BE GOING 
TO [facility] UNTIL PT'S MOTHER FINDS A MORTUARY FOR PT.

## 2020-08-22 NOTE — NUR
FROM [facility] CALLED BACK HE CAN BE IN IN ONE HOUR TO GIVE PT HIS LAST RIGHTS. I 
INFORMED PT'S MOTHER.

## 2020-08-22 NOTE — NUR
PAGED DR. GAMA TO COME TO SEE PT'S MOTHER WHO IS HERE TO DISCUSS POC WITH MD.

MD WILL COME IN ONE HOUR TO SEE PT'S MOM. I NOTIFIED PT'S MOTHER.

## 2020-08-22 NOTE — NUR
HOSPITALIST DR EVERETT RETURNED CALL ABOUT CRITICAL LAB VALUES. NEW ORDERS RECEIVED FOR 1 
UNIT PRBCS. WHEN THIS RN TRIED TO EXPLAIN THE CHANGES IN PTS NEURO AND RESPIRATORY STATUS 
AND CHANGES, MD STATED THAT HE "DOES NOT HAVE TIME FOR THIS" AND HE HAS "ANOTHER EMERGENCY" 
AND TO "JUST GIVE A BREATHING TREATMENT" AND HUNG UP THE PHONE.

## 2020-08-22 NOTE — NUR
ALL LINES REMOVES. POST MORTEM CARE PROVIDED. PLACED PT ON BAG AND TAKEN TO HOSPITAL  [facility] 
WITH SECURITY'S ASSISTANCE.

## 2020-08-22 NOTE — NUR
[employer] CALLED TO SEE IF A Worship  COULD COME IN TO THE HOSPITAL TO GIVE PT HIS LAST 
RIGHTS. [employer] IS WAITING FOR A CALL BACK. ALL  CHAPLAINS HAVE BEEN PAGED.

## 2020-08-22 NOTE — NUR
CALLED [employer] TO TRY AND OBTAIN  TO READ PT HIS LAST RIGHTS PER MOTHERS REQUEST. 
 SAID SHE WOULD LOOK AND TRY TO ACCOMMODATE REQUEST AND THEN GET BACK TO THIS RN.

## 2020-08-24 NOTE — NUR
NEDA FROM [facility]'S MORTUARY CALLED

THEY HAVE BEEN AUTHORIZED TO  DECEDENT

RELEASE WILL ACCOMPANY